# Patient Record
Sex: FEMALE | Race: WHITE | Employment: UNEMPLOYED | URBAN - METROPOLITAN AREA
[De-identification: names, ages, dates, MRNs, and addresses within clinical notes are randomized per-mention and may not be internally consistent; named-entity substitution may affect disease eponyms.]

---

## 2017-05-04 PROBLEM — K50.90 CROHN DISEASE (HCC): Status: ACTIVE | Noted: 2017-05-04

## 2017-05-09 NOTE — H&P
Gynecology History and Physical    Name: Christina Rosenbaum MRN: [id number] SSN: [id number]    YOB: 1980  Age: 30 y.o. Sex: female       Subjective:      Chief complaint:  Inevitable     Thomas Farooq is a 30 y.o.  female with a history of US on two days 4 days apart showing 6 week 2 day IUP with no FCA and no progression of pregnancy. She is admitted for Procedure(s) (LRB):  DILATATION AND CURETTAGE WITH SUCTION/ GESTATION= 10 WEEKS/ O+ BLOOD TYPE  (N/A). The current method of family planning is none. OB History      Para Term  AB TAB SAB Ectopic Multiple Living    7 3 2  1  1   3        Past Medical History:   Diagnosis Date    Abnormal Pap smear     Anemia NEC     Crohn's disease ([address]     Headache     Other ill-defined conditions     anemia    Perirectal abscess      Past Surgical History:   Procedure Laterality Date     DELIVERY ONLY      HX ADENOIDECTOMY      HX  SECTION      HX OTHER SURGICAL      tonsilectomy    HX OTHER SURGICAL      cesarian section    HX OTHER SURGICAL      rectal absess    HX TONSILLECTOMY      HX WISDOM TEETH EXTRACTION       Social History     Occupational History    Not on file. Social History Main Topics    Smoking status: Never Smoker    Smokeless tobacco: Not on file    Alcohol use No    Drug use: No    Sexual activity: Yes     Partners: Male     Family History   Problem Relation Age of Onset    Hypertension Father     High Cholesterol Father     High Cholesterol Mother         Allergies   Allergen Reactions    Sulfa (Sulfonamide Antibiotics) Hives     Prior to Admission medications    Medication Sig Start Date End Date Taking? Authorizing Provider   INFLIXIMAB (REMICADE IV) by IntraVENous route. Historical Provider   prenatal vit-iron fumarate-fa (PRENATAL S) 27-0.8 mg Tab tablet Take 1 Tab by mouth daily.     Historical Provider        Review of Systems:  A comprehensive review of systems was negative except for that written in the History of Present Illness. Objective: There were no vitals filed for this visit. Physical Exam:  Patient without distress. Heart: Regular rate and rhythm  Lung: clear to auscultation throughout lung fields, no wheezes, no rales, no rhonchi and normal respiratory effort    Assessment:     Active Problems:    * No active hospital problems. *     Inevitable  with history of recurrent SAB    Plan:     Procedure(s) (LRB):  DILATATION AND CURETTAGE WITH SUCTION/ GESTATION= 10 WEEKS/ O+ BLOOD TYPE  (N/A)  Discussed the risks of surgery including the risks of bleeding, infection, deep vein thrombosis, and surgical injuries to internal organs including but not limited to the bowels, bladder, rectum, and female reproductive organs. The patient understands the risks; any and all questions were answered to the patient's satisfaction.     Signed By:  Linnea Nielson MD     May 9, 2017

## 2017-05-10 ENCOUNTER — ANESTHESIA (OUTPATIENT)
Dept: SURGERY | Age: 37
End: 2017-05-10
Payer: COMMERCIAL

## 2017-05-10 ENCOUNTER — ANESTHESIA EVENT (OUTPATIENT)
Dept: SURGERY | Age: 37
End: 2017-05-10
Payer: COMMERCIAL

## 2017-05-10 ENCOUNTER — HOSPITAL ENCOUNTER (OUTPATIENT)
Age: 37
Setting detail: OUTPATIENT SURGERY
Discharge: HOME | End: 2017-05-10
Attending: OBSTETRICS & GYNECOLOGY | Admitting: OBSTETRICS & GYNECOLOGY
Payer: COMMERCIAL

## 2017-05-10 VITALS
HEART RATE: 82 BPM | SYSTOLIC BLOOD PRESSURE: 102 MMHG | OXYGEN SATURATION: 100 % | WEIGHT: 141.25 LBS | BODY MASS INDEX: 25.03 KG/M2 | RESPIRATION RATE: 20 BRPM | DIASTOLIC BLOOD PRESSURE: 64 MMHG | HEIGHT: 63 IN | TEMPERATURE: 98.1 F

## 2017-05-10 DIAGNOSIS — O03.9 SAB (SPONTANEOUS ABORTION): Primary | ICD-10-CM

## 2017-05-10 PROCEDURE — 74011000250 HC RX REV CODE- 250

## 2017-05-10 PROCEDURE — 76210000006 HC OR PH I REC 0.5 TO 1 HR: Performed by: OBSTETRICS & GYNECOLOGY

## 2017-05-10 PROCEDURE — 74011250636 HC RX REV CODE- 250/636

## 2017-05-10 PROCEDURE — 74011250636 HC RX REV CODE- 250/636: Performed by: OBSTETRICS & GYNECOLOGY

## 2017-05-10 PROCEDURE — 77030012317 HC CATH URET INT COVD -A: Performed by: OBSTETRICS & GYNECOLOGY

## 2017-05-10 PROCEDURE — 76060000032 HC ANESTHESIA 0.5 TO 1 HR: Performed by: OBSTETRICS & GYNECOLOGY

## 2017-05-10 PROCEDURE — 88305 TISSUE EXAM BY PATHOLOGIST: CPT | Performed by: OBSTETRICS & GYNECOLOGY

## 2017-05-10 PROCEDURE — 77030020143 HC AIRWY LARYN INTUB CGAS -A: Performed by: ANESTHESIOLOGY

## 2017-05-10 PROCEDURE — 76010000154 HC OR TIME FIRST 0.5 HR: Performed by: OBSTETRICS & GYNECOLOGY

## 2017-05-10 PROCEDURE — 76210000020 HC REC RM PH II FIRST 0.5 HR: Performed by: OBSTETRICS & GYNECOLOGY

## 2017-05-10 PROCEDURE — 77030008579 HC TBNG UTER SUC CARD -A: Performed by: OBSTETRICS & GYNECOLOGY

## 2017-05-10 PROCEDURE — 77030020164: Performed by: OBSTETRICS & GYNECOLOGY

## 2017-05-10 PROCEDURE — 74011250636 HC RX REV CODE- 250/636: Performed by: ANESTHESIOLOGY

## 2017-05-10 PROCEDURE — 77030020782 HC GWN BAIR PAWS FLX 3M -B: Performed by: ANESTHESIOLOGY

## 2017-05-10 PROCEDURE — 77030018836 HC SOL IRR NACL ICUM -A: Performed by: OBSTETRICS & GYNECOLOGY

## 2017-05-10 PROCEDURE — 77030011640 HC PAD GRND REM COVD -A: Performed by: OBSTETRICS & GYNECOLOGY

## 2017-05-10 RX ORDER — SODIUM CHLORIDE, SODIUM LACTATE, POTASSIUM CHLORIDE, CALCIUM CHLORIDE 600; 310; 30; 20 MG/100ML; MG/100ML; MG/100ML; MG/100ML
100 INJECTION, SOLUTION INTRAVENOUS CONTINUOUS
Status: DISCONTINUED | OUTPATIENT
Start: 2017-05-10 | End: 2017-05-10 | Stop reason: HOSPADM

## 2017-05-10 RX ORDER — HYDROMORPHONE HYDROCHLORIDE 2 MG/ML
0.5 INJECTION, SOLUTION INTRAMUSCULAR; INTRAVENOUS; SUBCUTANEOUS
Status: DISCONTINUED | OUTPATIENT
Start: 2017-05-10 | End: 2017-05-10 | Stop reason: HOSPADM

## 2017-05-10 RX ORDER — IBUPROFEN 800 MG/1
800 TABLET ORAL
Qty: 30 TAB | Refills: 0 | Status: SHIPPED | OUTPATIENT
Start: 2017-05-10 | End: 2018-03-12

## 2017-05-10 RX ORDER — ONDANSETRON 2 MG/ML
INJECTION INTRAMUSCULAR; INTRAVENOUS AS NEEDED
Status: DISCONTINUED | OUTPATIENT
Start: 2017-05-10 | End: 2017-05-10 | Stop reason: HOSPADM

## 2017-05-10 RX ORDER — SODIUM CHLORIDE 0.9 % (FLUSH) 0.9 %
5-10 SYRINGE (ML) INJECTION EVERY 8 HOURS
Status: DISCONTINUED | OUTPATIENT
Start: 2017-05-10 | End: 2017-05-10 | Stop reason: HOSPADM

## 2017-05-10 RX ORDER — LIDOCAINE HYDROCHLORIDE 10 MG/ML
0.1 INJECTION INFILTRATION; PERINEURAL AS NEEDED
Status: DISCONTINUED | OUTPATIENT
Start: 2017-05-10 | End: 2017-05-10 | Stop reason: HOSPADM

## 2017-05-10 RX ORDER — LIDOCAINE HYDROCHLORIDE 20 MG/ML
INJECTION, SOLUTION EPIDURAL; INFILTRATION; INTRACAUDAL; PERINEURAL AS NEEDED
Status: DISCONTINUED | OUTPATIENT
Start: 2017-05-10 | End: 2017-05-10 | Stop reason: HOSPADM

## 2017-05-10 RX ORDER — MIDAZOLAM HYDROCHLORIDE 1 MG/ML
2 INJECTION, SOLUTION INTRAMUSCULAR; INTRAVENOUS ONCE
Status: DISCONTINUED | OUTPATIENT
Start: 2017-05-10 | End: 2017-05-10 | Stop reason: HOSPADM

## 2017-05-10 RX ORDER — SODIUM CHLORIDE 0.9 % (FLUSH) 0.9 %
5-10 SYRINGE (ML) INJECTION AS NEEDED
Status: DISCONTINUED | OUTPATIENT
Start: 2017-05-10 | End: 2017-05-10 | Stop reason: HOSPADM

## 2017-05-10 RX ORDER — MIDAZOLAM HYDROCHLORIDE 1 MG/ML
2 INJECTION, SOLUTION INTRAMUSCULAR; INTRAVENOUS
Status: COMPLETED | OUTPATIENT
Start: 2017-05-10 | End: 2017-05-10

## 2017-05-10 RX ORDER — PROPOFOL 10 MG/ML
INJECTION, EMULSION INTRAVENOUS AS NEEDED
Status: DISCONTINUED | OUTPATIENT
Start: 2017-05-10 | End: 2017-05-10 | Stop reason: HOSPADM

## 2017-05-10 RX ORDER — SODIUM CHLORIDE, SODIUM LACTATE, POTASSIUM CHLORIDE, CALCIUM CHLORIDE 600; 310; 30; 20 MG/100ML; MG/100ML; MG/100ML; MG/100ML
75 INJECTION, SOLUTION INTRAVENOUS CONTINUOUS
Status: DISCONTINUED | OUTPATIENT
Start: 2017-05-10 | End: 2017-05-10 | Stop reason: HOSPADM

## 2017-05-10 RX ORDER — OXYCODONE HYDROCHLORIDE 5 MG/1
5 TABLET ORAL
Qty: 20 TAB | Refills: 0 | Status: SHIPPED | OUTPATIENT
Start: 2017-05-10 | End: 2018-02-07

## 2017-05-10 RX ORDER — FENTANYL CITRATE 50 UG/ML
100 INJECTION, SOLUTION INTRAMUSCULAR; INTRAVENOUS ONCE
Status: DISCONTINUED | OUTPATIENT
Start: 2017-05-10 | End: 2017-05-10 | Stop reason: HOSPADM

## 2017-05-10 RX ORDER — FENTANYL CITRATE 50 UG/ML
INJECTION, SOLUTION INTRAMUSCULAR; INTRAVENOUS AS NEEDED
Status: DISCONTINUED | OUTPATIENT
Start: 2017-05-10 | End: 2017-05-10 | Stop reason: HOSPADM

## 2017-05-10 RX ORDER — OXYCODONE HYDROCHLORIDE 5 MG/1
5 TABLET ORAL
Status: DISCONTINUED | OUTPATIENT
Start: 2017-05-10 | End: 2017-05-10 | Stop reason: HOSPADM

## 2017-05-10 RX ORDER — DEXAMETHASONE SODIUM PHOSPHATE 4 MG/ML
INJECTION, SOLUTION INTRA-ARTICULAR; INTRALESIONAL; INTRAMUSCULAR; INTRAVENOUS; SOFT TISSUE AS NEEDED
Status: DISCONTINUED | OUTPATIENT
Start: 2017-05-10 | End: 2017-05-10 | Stop reason: HOSPADM

## 2017-05-10 RX ADMIN — MIDAZOLAM HYDROCHLORIDE 2 MG: 1 INJECTION, SOLUTION INTRAMUSCULAR; INTRAVENOUS at 11:17

## 2017-05-10 RX ADMIN — FENTANYL CITRATE 50 MCG: 50 INJECTION, SOLUTION INTRAMUSCULAR; INTRAVENOUS at 13:03

## 2017-05-10 RX ADMIN — HYDROMORPHONE HYDROCHLORIDE 0.5 MG: 2 INJECTION, SOLUTION INTRAMUSCULAR; INTRAVENOUS; SUBCUTANEOUS at 13:30

## 2017-05-10 RX ADMIN — DEXAMETHASONE SODIUM PHOSPHATE 8 MG: 4 INJECTION, SOLUTION INTRA-ARTICULAR; INTRALESIONAL; INTRAMUSCULAR; INTRAVENOUS; SOFT TISSUE at 13:04

## 2017-05-10 RX ADMIN — ONDANSETRON 4 MG: 2 INJECTION INTRAMUSCULAR; INTRAVENOUS at 13:04

## 2017-05-10 RX ADMIN — PROPOFOL 200 MG: 10 INJECTION, EMULSION INTRAVENOUS at 12:53

## 2017-05-10 RX ADMIN — SODIUM CHLORIDE, SODIUM LACTATE, POTASSIUM CHLORIDE, AND CALCIUM CHLORIDE 75 ML/HR: 600; 310; 30; 20 INJECTION, SOLUTION INTRAVENOUS at 10:50

## 2017-05-10 RX ADMIN — LIDOCAINE HYDROCHLORIDE 100 MG: 20 INJECTION, SOLUTION EPIDURAL; INFILTRATION; INTRACAUDAL; PERINEURAL at 12:53

## 2017-05-10 RX ADMIN — FENTANYL CITRATE 50 MCG: 50 INJECTION, SOLUTION INTRAMUSCULAR; INTRAVENOUS at 12:53

## 2017-05-10 RX ADMIN — HYDROMORPHONE HYDROCHLORIDE 0.5 MG: 2 INJECTION, SOLUTION INTRAMUSCULAR; INTRAVENOUS; SUBCUTANEOUS at 13:35

## 2017-05-10 RX ADMIN — SODIUM CHLORIDE, SODIUM LACTATE, POTASSIUM CHLORIDE, AND CALCIUM CHLORIDE: 600; 310; 30; 20 INJECTION, SOLUTION INTRAVENOUS at 13:12

## 2017-05-10 NOTE — BRIEF OP NOTE
BRIEF OPERATIVE NOTE    Date of Procedure: 5/10/2017   Preoperative Diagnosis: Missed ab [O02.1]  Postoperative Diagnosis: Missed ab [O02.1]    Procedure(s):  DILATATION AND CURETTAGE WITH SUCTION/ GESTATION= 10 WEEKS/ O+ BLOOD TYPE   Surgeon(s) and Role:     * Harjeet Singh MD - Primary         Assistant Staff:       Surgical Staff:  Circ-1: Chet Rice RN  Scrub Tech-1: [employer]  Scrub Tech-2: [employer] [place]  Event Time In   Incision Start 1302   Incision Close 1309     Anesthesia: General   Estimated Blood Loss: 150 ml  Specimens:   ID Type Source Tests Collected by Time Destination   1 : Products of conception Fresh Uterus  Harjeet Singh MD 5/10/2017 1309 Pathology      Findings: 7 weeks sized anteverted uterus.   Normal EGBSUVVC, Moderate amount of POC   Complications: none  Implants: * No implants in log *

## 2017-05-10 NOTE — IP AVS SNAPSHOT
Gloria Margaret 
 
 
 [address] 
[phone] Patient: Karla Sprain MRN: [id number] IUJ:3/40/1827 You are allergic to the following Allergen Reactions Sulfa (Sulfonamide Antibiotics) Hives Recent Documentation Height Weight BMI OB Status Smoking Status 1.6 m 64.1 kg 25.02 kg/m2 Needs review Never Smoker Emergency Contacts Name Discharge Info Relation Home Work Mobile Chilango Pollack  Spouse [3]   [phone] About your hospitalization You were admitted on:  May 10, 2017 You last received care in the:  [facility] PACU You were discharged on:  May 10, 2017 Unit phone number:  [phone] Why you were hospitalized Your primary diagnosis was:  Not on File Providers Seen During Your Hospitalizations Provider Role Specialty Primary office phone Crissy Gordon MD Attending Provider Obstetrics & Gynecology [phone] Your Primary Care Physician (PCP) Primary Care Physician Office Phone Office Fax Abdi Simpson [phone] Follow-up Information Follow up With Details Comments Contact Info La Irving MD   [address] [place] [address] [phone] Crissy Gordon MD Schedule an appointment as soon as possible for a visit in 2 week(s)  [address] [place] [address] OB [facility]YN Group [facility] [address] 
[phone] Current Discharge Medication List  
  
START taking these medications Dose & Instructions Dispensing Information Comments Morning Noon Evening Bedtime  
 ibuprofen 800 mg tablet Commonly known as:  MOTRIN Your last dose was: Your next dose is:    
   
   
 Dose:  800 mg Take 1 Tab by mouth every eight (8) hours as needed for Pain. Quantity:  30 Tab Refills:  0 oxyCODONE IR 5 mg immediate release tablet Commonly known as:  Megan Esquivel Your last dose was: Your next dose is:    
   
   
 Dose:  5 mg Take 1 Tab by mouth once as needed. Max Daily Amount: 5 mg. Quantity:  20 Tab Refills:  0 CONTINUE these medications which have NOT CHANGED Dose & Instructions Dispensing Information Comments Morning Noon Evening Bedtime PRENATAL S 27-0.8 mg Tab tablet Generic drug:  prenatal vit-iron fumarate-fa Your last dose was: Your next dose is:    
   
   
 Dose:  1 Tab Take 1 Tab by mouth daily. Refills:  0 REMICADE IV Your last dose was: Your next dose is:    
   
   
 by IntraVENous route. Refills:  0 Where to Get Your Medications Information on where to get these meds will be given to you by the nurse or doctor. ! Ask your nurse or doctor about these medications  
  ibuprofen 800 mg tablet  
 oxyCODONE IR 5 mg immediate release tablet Discharge Instructions Dilation and Curettage: What to Expect at [facility] Your Recovery Dilation and curettage (D&C) is a procedure to remove tissue from the inside of the uterus. The doctor used a curved tool, called a curette, to gently scrape tissue from your uterus. You are likely to have a backache, or cramps similar to menstrual cramps, and pass small clots of blood from your vagina for the first few days. You may continue to have light vaginal bleeding for several weeks after the procedure. You will probably be able to go back to most of your normal activities in 1 or 2 days. This care sheet gives you a general idea about how long it will take for you to recover. But each person recovers at a different pace. Follow the steps below to get better as quickly as possible. How can you care for yourself at home? Activity · Rest when you feel tired. Getting enough sleep will help you recover. · Avoid strenuous activities, such as bicycle riding, jogging, weight lifting, or aerobic exercise, until your doctor says it is okay. · Most women are able to return to work the day after the procedure. · You may have some light vaginal bleeding. Wear sanitary pads if needed. Do not douche or use tampons for 2 weeks or until your doctor says it is okay. · Ask your doctor when it is okay for you to have sex. · If you could become pregnant, talk about birth control with your doctor. Do not try to become pregnant until your doctor says it is okay. Diet · You can eat your normal diet. If your stomach is upset, try bland, low-fat foods like plain rice, broiled chicken, toast, and yogurt. · Drink plenty of fluids (unless your doctor tells you not to). Medicines · Your doctor will tell you if and when you can restart your medicines. He or she will also give you instructions about taking any new medicines. · If you take blood thinners, such as warfarin (Coumadin), clopidogrel (Plavix), or aspirin, be sure to talk to your doctor. He or she will tell you if and when to start taking those medicines again. Make sure that you understand exactly what your doctor wants you to do. · Be safe with medicines. Take pain medicines exactly as directed. ¨ If the doctor gave you a prescription medicine for pain, take it as prescribed. ¨ If you are not taking a prescription pain medicine, ask your doctor if you can take an over-the-counter medicine. · If you think your pain medicine is making you sick to your stomach: 
¨ Take your medicine after meals (unless your doctor has told you not to). ¨ Ask your doctor for a different pain medicine. · If your doctor prescribed antibiotics, take them as directed. Do not stop taking them just because you feel better. You need to take the full course of antibiotics. Follow-up care is a key part of your treatment and safety. Be sure to make and go to all appointments, and call your doctor if you are having problems. It's also a good idea to know your test results and keep a list of the medicines you take. When should you call for help? Call 911 anytime you think you may need emergency care. For example, call if: 
· You passed out (lost consciousness). · You have severe trouble breathing. · You have chest pain and shortness of breath, or you cough up blood. · You have severe pain in your belly. Call your doctor now or seek immediate medical care if: 
· You have bright red vaginal bleeding that soaks one or more pads each hour for 2 or more hours. · You pass blood clots that are larger than a golf ball. · You have vaginal discharge that smells bad. · You are sick to your stomach or cannot keep fluids down. · You have pain that does not get better after you take pain medicine. · You have pain that is getting worse 2 days after the procedure. · You have a fever over 100°F. 
· Your belly feels tender, or full and hard. Watch closely for changes in your health, and be sure to contact your doctor if: 
· You do not get better as expected. Where can you learn more? Go to http://renetta-candida.info/. Enter [phone] in the search box to learn more about \"Dilation and Curettage: What to Expect at Home. \" Current as of: May 30, 2016 Content Version: 11.2 © 7111-8465 [employer]. Care instructions adapted under license by [employer] (which disclaims liability or warranty for this information). If you have questions about a medical condition or this instruction, always ask your healthcare professional. [place] [address] any warranty or liability for your use of this information. Discharge Orders None Introducing \[facility]\"" & HEALTH SERVICES!    
 Jonah Summers introduces [facility] patient portal. Now you can access parts of your medical record, email your doctor's office, and request medication refills online. 1. In your internet browser, go to [email or web address] 2. Click on the First Time User? Click Here link in the Sign In box. You will see the New Member Sign Up page. 3. Enter your [employer] Access Code exactly as it appears below. You will not need to use this code after youve completed the sign-up process. If you do not sign up before the expiration date, you must request a new code. · [employer] Access Code: [email or web address] Expires: 7/6/2017  9:57 AM 
 
4. Enter the last four digits of your Social Security Number (xxxx) and Date of Birth (mm/dd/yyyy) as indicated and click Submit. You will be taken to the next sign-up page. 5. Create a [employer] ID. This will be your [employer] login ID and cannot be changed, so think of one that is secure and easy to remember. 6. Create a [employer] password. You can change your password at any time. 7. Enter your Password Reset Question and Answer. This can be used at a later time if you forget your password. 8. Enter your e-mail address. You will receive e-mail notification when new information is available in [address] 9. Click Sign Up. You can now view and download portions of your medical record. 10. Click the Download Summary menu link to download a portable copy of your medical information. If you have questions, please visit the Frequently Asked Questions section of the [employer] website. Remember, [employer] is NOT to be used for urgent needs. For medical emergencies, dial 911. Now available from your iPhone and Android! General Information Please provide this summary of care documentation to your next provider. Patient Signature:  ____________________________________________________________ Date:  ____________________________________________________________  
  
Neita Hidden  Provider Signature: ____________________________________________________________ Date:  ____________________________________________________________

## 2017-05-10 NOTE — ANESTHESIA POSTPROCEDURE EVALUATION
Post-Anesthesia Evaluation and Assessment    Patient: Silva  MRN: [id number]  SSN: [id number]    YOB: 1980  Age: 30 y.o. Sex: female       Cardiovascular Function/Vital Signs  Visit Vitals    /65 (BP 1 Location: Left arm, BP Patient Position: At rest)    Pulse 93    Temp 36.7 °C (98.1 °F)    Resp 20    Ht 5' 3\" (1.6 m)    Wt 64.1 kg (141 lb 4 oz)    SpO2 100%    BMI 25.02 kg/m2       Patient is status post general anesthesia for Procedure(s):  DILATATION AND CURETTAGE WITH SUCTION/ GESTATION= 10 WEEKS/ O+ BLOOD TYPE . Nausea/Vomiting: None    Postoperative hydration reviewed and adequate. Pain:  Pain Scale 1: Numeric (0 - 10) (05/10/17 1335)  Pain Intensity 1: 4 (05/10/17 1335)   Managed    Neurological Status:   Neuro (WDL): Exceptions to WDL (05/10/17 1316)  Neuro  Neurologic State: Anesthetized (05/10/17 1316)   At baseline    Mental Status and Level of Consciousness: Awake. Pulmonary Status:   O2 Device: Room air (05/10/17 1346)   Adequate oxygenation and airway patent    Complications related to anesthesia: None    Post-anesthesia assessment completed.  No concerns    Signed By: Savana Lorenzo MD     May 10, 2017

## 2017-05-10 NOTE — IP AVS SNAPSHOT
Current Discharge Medication List  
  
START taking these medications Dose & Instructions Dispensing Information Comments Morning Noon Evening Bedtime  
 ibuprofen 800 mg tablet Commonly known as:  MOTRIN Your last dose was: Your next dose is:    
   
   
 Dose:  800 mg Take 1 Tab by mouth every eight (8) hours as needed for Pain. Quantity:  30 Tab Refills:  0  
     
   
   
   
  
 oxyCODONE IR 5 mg immediate release tablet Commonly known as:  Leo Longs Your last dose was: Your next dose is:    
   
   
 Dose:  5 mg Take 1 Tab by mouth once as needed. Max Daily Amount: 5 mg. Quantity:  20 Tab Refills:  0 CONTINUE these medications which have NOT CHANGED Dose & Instructions Dispensing Information Comments Morning Noon Evening Bedtime PRENATAL S 27-0.8 mg Tab tablet Generic drug:  prenatal vit-iron fumarate-fa Your last dose was: Your next dose is:    
   
   
 Dose:  1 Tab Take 1 Tab by mouth daily. Refills:  0 REMICADE IV Your last dose was: Your next dose is:    
   
   
 by IntraVENous route. Refills:  0 Where to Get Your Medications Information on where to get these meds will be given to you by the nurse or doctor. ! Ask your nurse or doctor about these medications  
  ibuprofen 800 mg tablet  
 oxyCODONE IR 5 mg immediate release tablet

## 2017-05-10 NOTE — OP NOTES
Marciana Koyanagi  MRN [id number]  DOS 5/10/17    OPERATIVE NOTE      Preoperative Diagnosis: Missed  at 10 weeks by LMP, O Positive blood type  Postoperative Diagnosis: same  Procedure: suction dilation and curettage  Surgeon:  Louis Galicia. Len Khan MD  Anesthesia:  General LMA  EBL: 150 ml  IVF: 1000 ml crystalloid  UOP: 100 ml clear urine drained prior to the case  Complications: none    Findings: 8 weeks anteverted uterus. Moderate amount of products of conception. Normal external genitals, vagina and cervix    Procedure:   Patient was taken to OR where GLMA was obtained without difficulty. She was then sterilly prepped and draped in dorsal lithotomy position in candy cane stirrups in usual fashion. Weighted sterile speculum was placed in patients posterior fornix and cervix visualized and grasped anteriorly with a single toothed tenaculum. Cervix was noted to be dilated to [address] already and 8mm curved suction curette was introduced to the fundus without difficulty. Suction was applied with evacuation of the products of conception. Sharp currettage was then undertaken until a gritty texture was appreciated throughout the endometrial cavity and the currettings collected for histologic examination. The remaining products of conception were then removed with the suction curette without difficulty. Instruments were then removed from the patient. Patient tolerated the procedure well. All counts were correct. Patient went to recovery in stable condition.

## 2017-05-10 NOTE — PROGRESS NOTES
Support given to patient in 6w2d fetal demise. \"[employer]\" early pregnancy loss brochure and Chasity Cobb support group information given along with contact information. Ayala Zaldivar M.Div.

## 2017-05-10 NOTE — DISCHARGE INSTRUCTIONS
Dilation and Curettage: What to Expect at Home  Your Recovery  Dilation and curettage (D&C) is a procedure to remove tissue from the inside of the uterus. The doctor used a curved tool, called a curette, to gently scrape tissue from your uterus. You are likely to have a backache, or cramps similar to menstrual cramps, and pass small clots of blood from your vagina for the first few days. You may continue to have light vaginal bleeding for several weeks after the procedure. You will probably be able to go back to most of your normal activities in 1 or 2 days. This care sheet gives you a general idea about how long it will take for you to recover. But each person recovers at a different pace. Follow the steps below to get better as quickly as possible. How can you care for yourself at home? Activity  · Rest when you feel tired. Getting enough sleep will help you recover. · Avoid strenuous activities, such as bicycle riding, jogging, weight lifting, or aerobic exercise, until your doctor says it is okay. · Most women are able to return to work the day after the procedure. · You may have some light vaginal bleeding. Wear sanitary pads if needed. Do not douche or use tampons for 2 weeks or until your doctor says it is okay. · Ask your doctor when it is okay for you to have sex. · If you could become pregnant, talk about birth control with your doctor. Do not try to become pregnant until your doctor says it is okay. Diet  · You can eat your normal diet. If your stomach is upset, try bland, low-fat foods like plain rice, broiled chicken, toast, and yogurt. · Drink plenty of fluids (unless your doctor tells you not to). Medicines  · Your doctor will tell you if and when you can restart your medicines. He or she will also give you instructions about taking any new medicines. · If you take blood thinners, such as warfarin (Coumadin), clopidogrel (Plavix), or aspirin, be sure to talk to your doctor.  He or she will tell you if and when to start taking those medicines again. Make sure that you understand exactly what your doctor wants you to do. · Be safe with medicines. Take pain medicines exactly as directed. ¨ If the doctor gave you a prescription medicine for pain, take it as prescribed. ¨ If you are not taking a prescription pain medicine, ask your doctor if you can take an over-the-counter medicine. · If you think your pain medicine is making you sick to your stomach:  ¨ Take your medicine after meals (unless your doctor has told you not to). ¨ Ask your doctor for a different pain medicine. · If your doctor prescribed antibiotics, take them as directed. Do not stop taking them just because you feel better. You need to take the full course of antibiotics. Follow-up care is a key part of your treatment and safety. Be sure to make and go to all appointments, and call your doctor if you are having problems. It's also a good idea to know your test results and keep a list of the medicines you take. When should you call for help? Call 911 anytime you think you may need emergency care. For example, call if:  · You passed out (lost consciousness). · You have severe trouble breathing. · You have chest pain and shortness of breath, or you cough up blood. · You have severe pain in your belly. Call your doctor now or seek immediate medical care if:  · You have bright red vaginal bleeding that soaks one or more pads each hour for 2 or more hours. · You pass blood clots that are larger than a golf ball. · You have vaginal discharge that smells bad. · You are sick to your stomach or cannot keep fluids down. · You have pain that does not get better after you take pain medicine. · You have pain that is getting worse 2 days after the procedure. · You have a fever over 100°F.  · Your belly feels tender, or full and hard.   Watch closely for changes in your health, and be sure to contact your doctor if:  · You do not get better as expected. Where can you learn more? Go to http://renetta-candida.info/. Enter [phone] in the search box to learn more about \"Dilation and Curettage: What to Expect at Home. \"  Current as of: May 30, 2016  Content Version: 11.2  © 0864-5649 [employer]. Care instructions adapted under license by [employer] (which disclaims liability or warranty for this information). If you have questions about a medical condition or this instruction, always ask your healthcare professional. [address] any warranty or liability for your use of this information.

## 2017-05-10 NOTE — ANESTHESIA PREPROCEDURE EVALUATION
Anesthetic History   No history of anesthetic complications            Review of Systems / Medical History  Pertinent labs reviewed    Pulmonary  Within defined limits                 Neuro/Psych         Headaches     Cardiovascular  Within defined limits                Exercise tolerance: >4 METS     GI/Hepatic/Renal               Comments: Crohn's dz Endo/Other        Anemia     Other Findings            Physical Exam    Airway  Mallampati: II  TM Distance: 4 - 6 cm  Neck ROM: normal range of motion   Mouth opening: Diminished (comment)     Cardiovascular  Regular rate and rhythm,  S1 and S2 normal,  no murmur, click, rub, or gallop             Dental  No notable dental hx       Pulmonary  Breath sounds clear to auscultation               Abdominal  GI exam deferred       Other Findings            Anesthetic Plan    ASA: 2  Anesthesia type: general          Induction: Intravenous  Anesthetic plan and risks discussed with: Patient

## 2018-02-19 PROBLEM — Z98.891 HISTORY OF C-SECTION: Status: ACTIVE | Noted: 2018-02-19

## 2018-02-19 PROBLEM — O09.529 AMA (ADVANCED MATERNAL AGE) MULTIGRAVIDA 35+: Status: ACTIVE | Noted: 2018-02-19

## 2018-02-19 PROBLEM — N96 HISTORY OF MULTIPLE MISCARRIAGES: Status: ACTIVE | Noted: 2018-02-19

## 2018-02-19 PROBLEM — K60.4 PERIRECTAL FISTULA: Status: ACTIVE | Noted: 2018-02-19

## 2018-03-12 PROBLEM — O34.219 HX OF CESAREAN SECTION COMPLICATING PREGNANCY: Status: ACTIVE | Noted: 2018-02-19

## 2018-03-12 PROBLEM — Z86.69 HX OF MIGRAINE DURING PREGNANCY: Status: ACTIVE | Noted: 2018-03-12

## 2018-03-12 PROBLEM — O99.611: Status: ACTIVE | Noted: 2017-05-04

## 2018-03-12 PROBLEM — Z36.82 ENCOUNTER FOR (NT) NUCHAL TRANSLUCENCY SCAN: Status: ACTIVE | Noted: 2018-03-12

## 2018-03-12 PROBLEM — Z87.59 HX OF MIGRAINE DURING PREGNANCY: Status: ACTIVE | Noted: 2018-03-12

## 2018-03-12 PROBLEM — O26.21: Status: ACTIVE | Noted: 2018-02-19

## 2018-04-24 PROBLEM — O09.522 ELDERLY MULTIGRAVIDA IN SECOND TRIMESTER: Status: ACTIVE | Noted: 2018-02-19

## 2018-04-24 PROBLEM — O26.22: Status: ACTIVE | Noted: 2018-02-19

## 2018-04-24 PROBLEM — O99.612: Status: ACTIVE | Noted: 2017-05-04

## 2018-04-24 PROBLEM — Z36.82 ENCOUNTER FOR (NT) NUCHAL TRANSLUCENCY SCAN: Status: RESOLVED | Noted: 2018-03-12 | Resolved: 2018-04-24

## 2018-04-24 PROBLEM — O26.892 HEADACHE IN PREGNANCY, ANTEPARTUM, SECOND TRIMESTER: Status: ACTIVE | Noted: 2018-03-12

## 2018-04-24 PROBLEM — O34.211 MATERNAL CARE DUE TO LOW TRANSVERSE UTERINE SCAR FROM PREVIOUS CESAREAN DELIVERY: Status: ACTIVE | Noted: 2018-02-19

## 2018-04-24 PROBLEM — R51.9 HEADACHE IN PREGNANCY, ANTEPARTUM, SECOND TRIMESTER: Status: ACTIVE | Noted: 2018-03-12

## 2018-07-10 PROBLEM — F11.90 OPIOID USE, UNSPECIFIED, UNCOMPLICATED: Status: ACTIVE | Noted: 2018-07-10

## 2018-09-13 NOTE — PROGRESS NOTES
Patient ID verified. Allergies, medical history, prenatal record and prior to admission medications verified. Pt instructed to be NPO after midnight. Pt instructed to arrive at hospital @0645,come to entrance C and sign in at the registration desk on the 4th floor. Patient instructed to come to hospital sooner if SROM, labor, or concerning symptoms. Patient verbalized understanding. Questions encouraged and answered.

## 2018-09-14 ENCOUNTER — ANESTHESIA (OUTPATIENT)
Dept: LABOR AND DELIVERY | Age: 38
End: 2018-09-14
Payer: COMMERCIAL

## 2018-09-14 ENCOUNTER — ANESTHESIA EVENT (OUTPATIENT)
Dept: LABOR AND DELIVERY | Age: 38
End: 2018-09-14
Payer: COMMERCIAL

## 2018-09-14 ENCOUNTER — HOSPITAL ENCOUNTER (INPATIENT)
Age: 38
LOS: 3 days | Discharge: HOME | End: 2018-09-17
Attending: OBSTETRICS & GYNECOLOGY | Admitting: OBSTETRICS & GYNECOLOGY
Payer: COMMERCIAL

## 2018-09-14 DIAGNOSIS — O34.211 MATERNAL CARE DUE TO LOW TRANSVERSE UTERINE SCAR FROM PREVIOUS CESAREAN DELIVERY: Primary | ICD-10-CM

## 2018-09-14 PROBLEM — Z3A.39 39 WEEKS GESTATION OF PREGNANCY: Status: ACTIVE | Noted: 2018-09-14

## 2018-09-14 PROBLEM — Z98.891 H/O CESAREAN SECTION: Status: ACTIVE | Noted: 2018-09-14

## 2018-09-14 LAB
ABO + RH BLD: NORMAL
BASE DEFICIT BLDCOA-SCNC: 0.5 MMOL/L (ref 0–2)
BASE DEFICIT BLDCOV-SCNC: 1.4 MMOL/L (ref 1.9–7.7)
BDY SITE: ABNORMAL
BDY SITE: ABNORMAL
BLOOD GROUP ANTIBODIES SERPL: NORMAL
ERYTHROCYTE [DISTWIDTH] IN BLOOD BY AUTOMATED COUNT: 18.7 %
HCO3 BLDCOA-SCNC: 26 MMOL/L (ref 22–26)
HCO3 BLDV-SCNC: 22 MMOL/L
HCT VFR BLD AUTO: 35.5 % (ref 35.8–46.3)
HGB BLD-MCNC: 11.8 G/DL (ref 11.7–15.4)
MCH RBC QN AUTO: 29.9 PG (ref 26.1–32.9)
MCHC RBC AUTO-ENTMCNC: 33.2 G/DL (ref 31.4–35)
MCV RBC AUTO: 90.1 FL (ref 79.6–97.8)
NRBC # BLD: 0 K/UL (ref 0–0.2)
PCO2 BLDCOA: 47 MMHG (ref 33–49)
PCO2 BLDCOV: 35 MMHG (ref 14.1–43.3)
PH BLDCOA: 7.35 [PH] (ref 7.21–7.31)
PH BLDCOV: 7.42 [PH] (ref 7.2–7.44)
PLATELET # BLD AUTO: 187 K/UL (ref 150–450)
PMV BLD AUTO: 9.6 FL (ref 9.4–12.3)
PO2 BLDCOA: 18 MMHG (ref 9–19)
PO2 BLDV: 36 MMHG (ref 30.4–57.2)
RBC # BLD AUTO: 3.94 M/UL (ref 4.05–5.2)
SERVICE CMNT-IMP: ABNORMAL
SERVICE CMNT-IMP: ABNORMAL
SPECIMEN EXP DATE BLD: NORMAL
WBC # BLD AUTO: 8.1 K/UL (ref 4.3–11.1)

## 2018-09-14 PROCEDURE — 77030018846 HC SOL IRR STRL H20 ICUM -A: Performed by: OBSTETRICS & GYNECOLOGY

## 2018-09-14 PROCEDURE — 77030002888 HC SUT CHRMC J&J -A: Performed by: OBSTETRICS & GYNECOLOGY

## 2018-09-14 PROCEDURE — 65270000029 HC RM PRIVATE

## 2018-09-14 PROCEDURE — 74011250636 HC RX REV CODE- 250/636

## 2018-09-14 PROCEDURE — 74011250637 HC RX REV CODE- 250/637: Performed by: OBSTETRICS & GYNECOLOGY

## 2018-09-14 PROCEDURE — 75410000003 HC RECOV DEL/VAG/CSECN EA 0.5 HR: Performed by: OBSTETRICS & GYNECOLOGY

## 2018-09-14 PROCEDURE — 74011000250 HC RX REV CODE- 250

## 2018-09-14 PROCEDURE — 76060000078 HC EPIDURAL ANESTHESIA: Performed by: OBSTETRICS & GYNECOLOGY

## 2018-09-14 PROCEDURE — 77030034696 HC CATH URETH FOL 2W BARD -A: Performed by: OBSTETRICS & GYNECOLOGY

## 2018-09-14 PROCEDURE — 77030018836 HC SOL IRR NACL ICUM -A: Performed by: OBSTETRICS & GYNECOLOGY

## 2018-09-14 PROCEDURE — 77030007880 HC KT SPN EPDRL BBMI -B: Performed by: ANESTHESIOLOGY

## 2018-09-14 PROCEDURE — 4A1HXCZ MONITORING OF PRODUCTS OF CONCEPTION, CARDIAC RATE, EXTERNAL APPROACH: ICD-10-PCS | Performed by: OBSTETRICS & GYNECOLOGY

## 2018-09-14 PROCEDURE — 74011250636 HC RX REV CODE- 250/636: Performed by: ANESTHESIOLOGY

## 2018-09-14 PROCEDURE — 82803 BLOOD GASES ANY COMBINATION: CPT

## 2018-09-14 PROCEDURE — 86901 BLOOD TYPING SEROLOGIC RH(D): CPT

## 2018-09-14 PROCEDURE — 77030003665 HC NDL SPN BBMI -A: Performed by: ANESTHESIOLOGY

## 2018-09-14 PROCEDURE — 77030031139 HC SUT VCRL2 J&J -A: Performed by: OBSTETRICS & GYNECOLOGY

## 2018-09-14 PROCEDURE — 85027 COMPLETE CBC AUTOMATED: CPT

## 2018-09-14 PROCEDURE — 76010000391 HC C SECN FIRST 1 HR: Performed by: OBSTETRICS & GYNECOLOGY

## 2018-09-14 PROCEDURE — 74011250637 HC RX REV CODE- 250/637: Performed by: ANESTHESIOLOGY

## 2018-09-14 PROCEDURE — 77030032490 HC SLV COMPR SCD KNE COVD -B: Performed by: OBSTETRICS & GYNECOLOGY

## 2018-09-14 PROCEDURE — 74011250636 HC RX REV CODE- 250/636: Performed by: OBSTETRICS & GYNECOLOGY

## 2018-09-14 RX ORDER — ONDANSETRON 2 MG/ML
INJECTION INTRAMUSCULAR; INTRAVENOUS AS NEEDED
Status: DISCONTINUED | OUTPATIENT
Start: 2018-09-14 | End: 2018-09-14 | Stop reason: HOSPADM

## 2018-09-14 RX ORDER — KETOROLAC TROMETHAMINE 30 MG/ML
INJECTION, SOLUTION INTRAMUSCULAR; INTRAVENOUS AS NEEDED
Status: DISCONTINUED | OUTPATIENT
Start: 2018-09-14 | End: 2018-09-14 | Stop reason: HOSPADM

## 2018-09-14 RX ORDER — KETOROLAC TROMETHAMINE 30 MG/ML
30 INJECTION, SOLUTION INTRAMUSCULAR; INTRAVENOUS
Status: DISPENSED | OUTPATIENT
Start: 2018-09-14 | End: 2018-09-15

## 2018-09-14 RX ORDER — OXYTOCIN/RINGER'S LACTATE 30/500 ML
250 PLASTIC BAG, INJECTION (ML) INTRAVENOUS ONCE
Status: DISCONTINUED | OUTPATIENT
Start: 2018-09-14 | End: 2018-09-14 | Stop reason: HOSPADM

## 2018-09-14 RX ORDER — SODIUM CHLORIDE, SODIUM LACTATE, POTASSIUM CHLORIDE, CALCIUM CHLORIDE 600; 310; 30; 20 MG/100ML; MG/100ML; MG/100ML; MG/100ML
75 INJECTION, SOLUTION INTRAVENOUS CONTINUOUS
Status: DISCONTINUED | OUTPATIENT
Start: 2018-09-14 | End: 2018-09-14 | Stop reason: HOSPADM

## 2018-09-14 RX ORDER — OXYTOCIN/RINGER'S LACTATE 30/500 ML
PLASTIC BAG, INJECTION (ML) INTRAVENOUS
Status: DISCONTINUED | OUTPATIENT
Start: 2018-09-14 | End: 2018-09-14 | Stop reason: HOSPADM

## 2018-09-14 RX ORDER — SODIUM CHLORIDE 0.9 % (FLUSH) 0.9 %
5-10 SYRINGE (ML) INJECTION AS NEEDED
Status: DISCONTINUED | OUTPATIENT
Start: 2018-09-14 | End: 2018-09-14 | Stop reason: HOSPADM

## 2018-09-14 RX ORDER — FAMOTIDINE 20 MG/1
20 TABLET, FILM COATED ORAL
Status: COMPLETED | OUTPATIENT
Start: 2018-09-14 | End: 2018-09-14

## 2018-09-14 RX ORDER — OXYCODONE HYDROCHLORIDE 5 MG/1
10 TABLET ORAL
Status: DISPENSED | OUTPATIENT
Start: 2018-09-14 | End: 2018-09-15

## 2018-09-14 RX ORDER — SODIUM CHLORIDE 0.9 % (FLUSH) 0.9 %
5-10 SYRINGE (ML) INJECTION EVERY 8 HOURS
Status: DISCONTINUED | OUTPATIENT
Start: 2018-09-14 | End: 2018-09-14 | Stop reason: HOSPADM

## 2018-09-14 RX ORDER — TRISODIUM CITRATE DIHYDRATE AND CITRIC ACID MONOHYDRATE 500; 334 MG/5ML; MG/5ML
30 SOLUTION ORAL
Status: COMPLETED | OUTPATIENT
Start: 2018-09-14 | End: 2018-09-14

## 2018-09-14 RX ORDER — NALOXONE HYDROCHLORIDE 0.4 MG/ML
0.2 INJECTION, SOLUTION INTRAMUSCULAR; INTRAVENOUS; SUBCUTANEOUS
Status: ACTIVE | OUTPATIENT
Start: 2018-09-14 | End: 2018-09-15

## 2018-09-14 RX ORDER — SIMETHICONE 80 MG
80 TABLET,CHEWABLE ORAL
Status: DISCONTINUED | OUTPATIENT
Start: 2018-09-14 | End: 2018-09-17 | Stop reason: HOSPADM

## 2018-09-14 RX ORDER — DIPHENHYDRAMINE HYDROCHLORIDE 50 MG/ML
12.5 INJECTION, SOLUTION INTRAMUSCULAR; INTRAVENOUS
Status: ACTIVE | OUTPATIENT
Start: 2018-09-14 | End: 2018-09-15

## 2018-09-14 RX ORDER — HYDROMORPHONE HYDROCHLORIDE 2 MG/ML
1 INJECTION, SOLUTION INTRAMUSCULAR; INTRAVENOUS; SUBCUTANEOUS
Status: DISPENSED | OUTPATIENT
Start: 2018-09-14 | End: 2018-09-15

## 2018-09-14 RX ORDER — CEFAZOLIN SODIUM/WATER 2 G/20 ML
2 SYRINGE (ML) INTRAVENOUS ONCE
Status: COMPLETED | OUTPATIENT
Start: 2018-09-14 | End: 2018-09-14

## 2018-09-14 RX ORDER — SODIUM CHLORIDE, SODIUM LACTATE, POTASSIUM CHLORIDE, CALCIUM CHLORIDE 600; 310; 30; 20 MG/100ML; MG/100ML; MG/100ML; MG/100ML
25 INJECTION, SOLUTION INTRAVENOUS CONTINUOUS
Status: DISCONTINUED | OUTPATIENT
Start: 2018-09-14 | End: 2018-09-16

## 2018-09-14 RX ORDER — MORPHINE SULFATE 0.5 MG/ML
INJECTION, SOLUTION EPIDURAL; INTRATHECAL; INTRAVENOUS AS NEEDED
Status: DISCONTINUED | OUTPATIENT
Start: 2018-09-14 | End: 2018-09-14 | Stop reason: HOSPADM

## 2018-09-14 RX ORDER — DEXTROSE, SODIUM CHLORIDE, SODIUM LACTATE, POTASSIUM CHLORIDE, AND CALCIUM CHLORIDE 5; .6; .31; .03; .02 G/100ML; G/100ML; G/100ML; G/100ML; G/100ML
125 INJECTION, SOLUTION INTRAVENOUS CONTINUOUS
Status: DISCONTINUED | OUTPATIENT
Start: 2018-09-14 | End: 2018-09-14 | Stop reason: HOSPADM

## 2018-09-14 RX ORDER — BUPIVACAINE HYDROCHLORIDE 7.5 MG/ML
INJECTION, SOLUTION INTRASPINAL AS NEEDED
Status: DISCONTINUED | OUTPATIENT
Start: 2018-09-14 | End: 2018-09-14 | Stop reason: HOSPADM

## 2018-09-14 RX ORDER — EPHEDRINE SULFATE 50 MG/ML
INJECTION, SOLUTION INTRAVENOUS AS NEEDED
Status: DISCONTINUED | OUTPATIENT
Start: 2018-09-14 | End: 2018-09-14 | Stop reason: HOSPADM

## 2018-09-14 RX ADMIN — SODIUM CHLORIDE, SODIUM LACTATE, POTASSIUM CHLORIDE, AND CALCIUM CHLORIDE: 600; 310; 30; 20 INJECTION, SOLUTION INTRAVENOUS at 07:25

## 2018-09-14 RX ADMIN — OXYCODONE HYDROCHLORIDE 10 MG: 5 TABLET ORAL at 19:07

## 2018-09-14 RX ADMIN — KETOROLAC TROMETHAMINE 30 MG: 30 INJECTION, SOLUTION INTRAMUSCULAR at 19:05

## 2018-09-14 RX ADMIN — ONDANSETRON 4 MG: 2 INJECTION INTRAMUSCULAR; INTRAVENOUS at 09:08

## 2018-09-14 RX ADMIN — MORPHINE SULFATE 0.15 MCG: 0.5 INJECTION, SOLUTION EPIDURAL; INTRATHECAL; INTRAVENOUS at 08:51

## 2018-09-14 RX ADMIN — EPHEDRINE SULFATE 15 MG: 50 INJECTION, SOLUTION INTRAVENOUS at 09:18

## 2018-09-14 RX ADMIN — OXYCODONE HYDROCHLORIDE 10 MG: 5 TABLET ORAL at 12:16

## 2018-09-14 RX ADMIN — SODIUM CHLORIDE, SODIUM LACTATE, POTASSIUM CHLORIDE, AND CALCIUM CHLORIDE: 600; 310; 30; 20 INJECTION, SOLUTION INTRAVENOUS at 09:25

## 2018-09-14 RX ADMIN — SODIUM CHLORIDE, SODIUM LACTATE, POTASSIUM CHLORIDE, AND CALCIUM CHLORIDE 75 ML/HR: 600; 310; 30; 20 INJECTION, SOLUTION INTRAVENOUS at 08:15

## 2018-09-14 RX ADMIN — SIMETHICONE CHEW TAB 80 MG 80 MG: 80 TABLET ORAL at 12:16

## 2018-09-14 RX ADMIN — KETOROLAC TROMETHAMINE 30 MG: 30 INJECTION, SOLUTION INTRAMUSCULAR; INTRAVENOUS at 09:24

## 2018-09-14 RX ADMIN — Medication 300 ML/HR: at 09:08

## 2018-09-14 RX ADMIN — FAMOTIDINE 20 MG: 20 TABLET ORAL at 08:20

## 2018-09-14 RX ADMIN — SODIUM CITRATE AND CITRIC ACID MONOHYDRATE 30 ML: 500; 334 SOLUTION ORAL at 08:20

## 2018-09-14 RX ADMIN — BUPIVACAINE HYDROCHLORIDE 1.6 ML: 7.5 INJECTION, SOLUTION INTRASPINAL at 08:51

## 2018-09-14 RX ADMIN — Medication 2 G: at 08:45

## 2018-09-14 NOTE — LACTATION NOTE

## 2018-09-14 NOTE — PROGRESS NOTES
Pt presented for scheduled . Pt states she had a phone interview with [address] [place] [address] RN and the information that was obtained has not changed. POC reviewed. IV started, Consents witnessed. Lab work drawn, sent to lab.

## 2018-09-14 NOTE — OP NOTES
Joanna [facility]ondelet [facility]  [id number]      INTRAUTERINE PREGNANCY  SECTION FULL OP NOTE        DATE OF PROCEDURE:  2018    PREOPERATIVE DIAGNOSIS:  jennifer 2018 Repeat  Section (#4)    POSTOPERATIVE DIAGNOSIS:  S/A with viable baby girl     ADDITIONAL DIAGNOSES: none    PROCEDURE: Low transverse  section    SURGEON:  Olive Smith MD    ASSISTANT:  Gris Rey DO    ANESTHESIA: Spinal    EBL: 062 cc    COMPLICATIONS: none    OPERATIVE PROCEDURE: Patient was placed on the operating room table in the supine position/left lateral tilt. Time out was done to confirm the operating procedure, surgeon, patient and site. Once confirmed by the team, procedure was started. After having adequate regional anesthesia by spinal injection, the patient was prepped and draped in the usual fashion for abdominal surgery. A Pfannenstiel incision was made and carried down sharply through the skin, subcutaneous tissue, and fascia. Fascia was sharply dissected free from underlying rectus muscles. The peritoneum was sharply entered and extended vertically. DeLee bladder blade was then placed over the bladder. The visceroperitoneal reflection over the lower uterine segment was incised transversely and the bladder flap sharply and bluntly developed. The uterus was incised transversely, then extended bluntly, and the infants head was delivered without difficulty and fundal pressure. Fluid was clear. Cord was clamped and cut. Mouth and nose were suctioned clean. The infant was given to the NICU personnel present at the time of delivery. The placenta was expressed, intact on inspection. The uterus was exteriorized, wrapped in a wet lap square, curetted with a dry square, and closed in a double-layered fashion with #1 chromic. Hemostasis appeared adequate. The cul-de-sac was then irrigated and suctioned clean. The uterus was placed in the abdomen. The gutters were irrigated and suctioned clean.  The peritoneum and rectus muscles were closed with 2-0 chromic. The fascia was closed with 0 vicryl. Running 2-0 plain was used to reapproximate the subcutaneous tissue. 4-0 Vicryl was used in a subcuticular stitch. The patient tolerated the procedure well and went to the recovery room in satisfactory condition.

## 2018-09-14 NOTE — PROGRESS NOTES
Report received from [address] [place] [address]. Care assumed. Requests pain medication for incisional pain and headache. Assured that pain medication would be given now.

## 2018-09-14 NOTE — L&D DELIVERY NOTE
Delivery Summary    Patient: Crystal Crawford MRN: [id number]  SSN: [id number]    YOB: 1980  Age: 41 y.o. Sex: female        Information for the patient's :  Obdulia Palmer [[id number]]       Labor Events:    Labor: No   Rupture Date:     Rupture Time:     Rupture Type AROM   Amniotic Fluid Volume: Moderate    Amniotic Fluid Description: Clear None   Induction: None       Augmentation: None   Labor Events: None     Cervical Ripening:     None     Delivery Events:  Episiotomy: None   Laceration(s): None     Repaired:      Number of Repair Packets:     Suture Type and Size:       Estimated Blood Loss (ml): 800.00ml       Delivery Date: 2018    Delivery Time: 9:06 AM  Delivery Type: , Low Transverse   Details    Trial of Labor: No   Primary/Repeat: Repeat   Priority: Routine   Indications:  Prior Uterine Surgery   Incision type:     Sex:  Female     Gestational Age: 36w3d  Delivery Clinician:  Edvin Toscano  Living Status: Living   Delivery Location: OR            APGARS  One minute Five minutes Ten minutes   Skin color: 1   1        Heart rate: 2   2        Grimace: 2   2        Muscle tone: 2   2        Breathin   2        Totals: 9   9          Presentation: Vertex    Position:        Resuscitation Method:  Suctioning-bulb; Tactile Stimulation     Meconium Stained: None      Cord Vessels: 3 Vessels      Cord Events:    Cord Blood Sent?:  Yes    Blood Gases Sent?:  Yes    Placenta:  Date/Time:   9:06 AM  Removal: Expressed      Appearance: Intact; Normal     [place] Measurements:  Birth Weight: 3.295 kg      Birth Length: 51 cm      Head Circumference: 35 cm      Chest Circumference: 33.5 cm     Abdominal Girth:       Other Providers:   Chandu ORO BARBARA J;ISIAH BROCK;SHASHA DILLON;ALYSA LANDRY;SHAYY WILKERSON;DIANELYS SIERRA, Obstetrician;Primary Nurse;Primary  Nurse;Neonatologist;Anesthesiologist;Crna;Respiratory Therapist             Group B Strep:   Lab Results   Component Value Date/Time    GrBStrep, External negative 2014     Information for the patient's :  Khoa Lopez [[id number]]   No results found for: ABORH, PCTABR, PCTDIG, BILI, ABORHEXT, ABORH    Lab Results   Component Value Date/Time    APH 7.351 (H) 2018 09:06 AM    APCO2 47 2018 09:06 AM    APO2 18 2018 09:06 AM    AHCO3 26 2018 09:06 AM    ABDC 0.5 2018 09:06 AM    EPHV 7.423 2018 09:06 AM    PCO2V 35 2018 09:06 AM    PO2V 36 2018 09:06 AM    HCO3V 22 2018 09:06 AM    EBDV 1.4 (L) 2018 09:06 AM    SITE CORD 2018 09:06 AM    SITE CORD 2018 09:06 AM    RSCOM na at 2018 9 18 05 AM. Not read back. 2018 09:06 AM    RSCOM na at 2018 9 20 04 AM. Not read back.  2018 09:06 AM

## 2018-09-14 NOTE — IP AVS SNAPSHOT
[address] Lela Ornelas  
[phone] Patient: Reji Roe MRN: [id number] About your hospitalization You were admitted on:  2018 You last received care in the:  [address] [facility] You were discharged on:  2018 Why you were hospitalized Your primary diagnosis was:  H/O  Section Your diagnoses also included:  39 Weeks Gestation Of Pregnancy Follow-up Information Follow up With Details Comments Contact Info Paulie Husbands Alt, DO In 2 weeks  [address] [facility] [address] [facility], [place], operated by [facility] [address] 
[phone] Your Scheduled Appointments   1:45 PM EDT PostPartum 2 week with Merline Rover, MD  
[facility] ([facility]) [address] [place] [phone]  
[phone] 2018  9:15 AM EDT POSTPARTUM 6 WEEK with Merline Rover, MD  
[facility] ([facility]) [address] [place] [id number] [phone] Discharge Orders Procedure Order Date Status Priority Quantity Spec Type Associated Dx CALL YOUR DOCTOR For: Other Call for fever >100.4, vaginal bleeding > 1 pad per hour, s&s of wound infection 18 0903 Normal Routine 1  Maternal care due to low transverse uterine scar from previous  delivery [[id number]] Comments:  Call for fever >100.4, vaginal bleeding > 1 pad per hour, s&s of wound infection Questions: For:  Other ACTIVITY AFTER DISCHARGE Patient should: Restrict driving Pelvic Rest  0903 Normal Routine 1  Maternal care due to low transverse uterine scar from previous  delivery [[id number]] Comments:  Pelvic Rest  
  Questions: Patient should:  Restrict driving DIET REGULAR 18 0903 Normal Routine 1  Maternal care due to low transverse uterine scar from previous  delivery [[id number]] A check yefri indicates which time of day the medication should be taken. My Medications START taking these medications Instructions Each Dose to Equal  
 Morning Noon Evening Bedtime  
 ibuprofen 800 mg tablet Commonly known as:  MOTRIN Your last dose was: Your next dose is: Take 1 Tab by mouth every eight (8) hours as needed. 800 mg  
    
   
   
   
  
 oxyCODONE-acetaminophen 7.5-325 mg per tablet Commonly known as:  PERCOCET 7.5 Your last dose was: Your next dose is: Take 1-2 Tabs by mouth every six (6) hours as needed. Max Daily Amount: 8 Tabs. 1-2 Tab CONTINUE taking these medications Instructions Each Dose to Equal  
 Morning Noon Evening Bedtime  
 butalbital-acetaminophen-caffeine -40 mg per tablet Commonly known as:  Othelia Rising Your last dose was: Your next dose is:    
   
   
 1-2 tab po q 6 hrs prn headache  
     
   
   
   
  
 doxylamine succinate 25 mg tablet Commonly known as:  Viri Do Your last dose was: Your next dose is: Take 25 mg by mouth nightly as needed for Sleep. 25 mg Ferrous Sulfate 47.5 mg iron Tber tablet Commonly known as:  SLOW FE  
   
Your last dose was: Your next dose is: Take 1 Tab by mouth daily. 1 Tab NexIUM 40 mg capsule Generic drug:  esomeprazole Your last dose was: Your next dose is: Take  by mouth daily. PRENATAL S 27 mg iron- 0.8 mg Tab tablet Generic drug:  prenatal vit-iron fumarate-fa Your last dose was: Your next dose is: Take 1 Tab by mouth daily. 1 Tab  
    
   
   
   
  
 raNITIdine 75 mg Tab Commonly known as:  ZANTAC Your last dose was: Your next dose is: Take 75 mg by mouth two (2) times a day. 75 mg  
    
   
   
   
  
 TYLENOL 325 mg tablet Generic drug:  acetaminophen Your last dose was: Your next dose is: Take  by mouth every four (4) hours as needed for Pain. VITAMIN D3 1,000 unit Cap Generic drug:  cholecalciferol Your last dose was: Your next dose is: Take  by mouth daily. Where to Get Your Medications These medications were sent to [address] [place] [address] [employer] Phone:  [phone]  
  ibuprofen 800 mg tablet Information on where to get these meds will be given to you by the nurse or doctor. ! Ask your nurse or doctor about these medications  
  oxyCODONE-acetaminophen 7.5-325 mg per tablet Opioid Education Prescription Opioids: What You Need to Know: 
 
Prescription opioids can be used to help relieve moderate-to-severe pain and are often prescribed following a surgery or injury, or for certain health conditions. These medications can be an important part of treatment but also come with serious risks. Opioids are strong pain medicines. Examples include hydrocodone, oxycodone, fentanyl, and morphine. Heroin is an example of an illegal opioid. It is important to work with your health care provider to make sure you are getting the safest, most effective care. WHAT ARE THE RISKS AND SIDE EFFECTS OF OPIOID USE? Prescription opioids carry serious risks of addiction and overdose, especially with prolonged use. An opioid overdose, often marked by slow breathing, can cause sudden death. The use of prescription opioids can have a number of side effects as well, even when taken as directed. · Tolerance-meaning you might need to take more of a medication for the same pain relief · Physical dependence-meaning you have symptoms of withdrawal when the medication is stopped. Withdrawal symptoms can include nausea, sweating, chills, diarrhea, stomach cramps, and muscle aches. Withdrawal can last up to several weeks, depending on which drug you took and how long you took it. · Increased sensitivity to pain · Constipation · Nausea, vomiting, and dry mouth · Sleepiness and dizziness · Confusion · Depression · Low levels of testosterone that can result in lower sex drive, energy, and strength · Itching and sweating RISKS ARE GREATER WITH:      
· History of drug misuse, substance use disorder, or overdose · Mental health conditions (such as depression or anxiety) · Sleep apnea · Older age (72 years or older) · Pregnancy Avoid alcohol while taking prescription opioids. Also, unless specifically advised by your health care provider, medications to avoid include: · Benzodiazepines (such as Xanax or Valium) · Muscle relaxants (such as Soma or Flexeril) · Hypnotics (such as Ambien or Lunesta) · Other prescription opioids KNOW YOUR OPTIONS Talk to your health care provider about ways to manage your pain that don't involve prescription opioids. Some of these options may actually work better and have fewer risks and side effects. Options may include: 
· Pain relievers such as acetaminophen, ibuprofen, and naproxen · Some medications that are also used for depression or seizures · Physical therapy and exercise · Counseling to help patients learn how to cope better with triggers of pain and stress. · Application of heat or cold compress · Massage therapy · Relaxation techniques Be Informed Make sure you know the name of your medication, how much and how often to take it, and its potential risks & side effects.  
 
IF YOU ARE PRESCRIBED OPIOIDS FOR PAIN: 
 · Never take opioids in greater amounts or more often than prescribed. Remember the goal is not to be pain-free but to manage your pain at a tolerable level. · Follow up with your primary care provider to: · Work together to create a plan on how to manage your pain. · Talk about ways to help manage your pain that don't involve prescription opioids. · Talk about any and all concerns and side effects. · Help prevent misuse and abuse. · Never sell or share prescription opioids · Help prevent misuse and abuse. · Store prescription opioids in a secure place and out of reach of others (this may include visitors, children, friends, and family). · Safely dispose of unused/unwanted prescription opioids: Find your community drug take-back program or your pharmacy mail-back program, or flush them down the toilet, following guidance from the Food and Drug Administration (www.fda.gov/Drugs/ResourcesForYou). · Visit www.cdc.gov/drugoverdose to learn about the risks of opioid abuse and overdose. · If you believe you may be struggling with addiction, tell your health care provider and ask for guidance or call [address] [place], [place] [address]rose Armando at [phone]. Discharge Instructions Discharge instruction to follow: Activity: Pelvis rest for 6 weeks No heavy lifting over 15 lbs for 2 weeks No driving for 2 weeks No push/pull motion such as sweeping or vacuuming for 2 weeks No tub baths for 6 weeks  section keep incision clean and dry, may shower as normal with soap and water. Inspect incision every day for signs of infection listed below. Continue to use rosario-bottle with every void or bowel movement until comfortable stopping. Change sanitary pad after each urination or bowel movement.  
 
Call MD for the following: 
    Fever over 101 F; pain not relieved by medication; foul smelling vaginal discharge or increase in vaginal bleeding. Redness, swelling, or drainage from  incision. Take medication as prescribed. Follow up with MD as order.  Section: What to Expect at [facility] Your Recovery A  section, or , is surgery to deliver your baby through a cut, called an incision, that the doctor makes in your lower belly and uterus. You may have some pain in your lower belly and need pain medicine for 1 to 2 weeks. You can expect some vaginal bleeding for several weeks. You will probably need about 6 weeks to fully recover. It is important to take it easy while the incision is healing. Avoid heavy lifting, strenuous activities, or exercises that strain the belly muscles while you are recovering. Ask a family member or friend for help with housework, cooking, and shopping. This care sheet gives you a general idea about how long it will take for you to recover. But each person recovers at a different pace. Follow the steps below to get better as quickly as possible. How can you care for yourself at home? Activity 
  · Rest when you feel tired. Getting enough sleep will help you recover.  
  · Try to walk each day. Start by walking a little more than you did the day before. Bit by bit, increase the amount you walk. Walking boosts blood flow and helps prevent pneumonia, constipation, and blood clots.  
  · Avoid strenuous activities, such as bicycle riding, jogging, weightlifting, and aerobic exercise, for 6 weeks or until your doctor says it is okay.  
  · Until your doctor says it is okay, do not lift anything heavier than your baby.  
  · Do not do sit-ups or other exercises that strain the belly muscles for 6 weeks or until your doctor says it is okay.  
  · Hold a pillow over your incision when you cough or take deep breaths. This will support your belly and decrease your pain.  
  · You may shower as usual. Pat the incision dry when you are done.   · You will have some vaginal bleeding. Wear sanitary pads. Do not douche or use tampons until your doctor says it is okay.  
  · Ask your doctor when you can drive again.  
  · You will probably need to take at least 6 weeks off work. It depends on the type of work you do and how you feel.  
  · Ask your doctor when it is okay for you to have sex. Diet 
  · You can eat your normal diet. If your stomach is upset, try bland, low-fat foods like plain rice, broiled chicken, toast, and yogurt.  
  · Drink plenty of fluids (unless your doctor tells you not to).  
  · You may notice that your bowel movements are not regular right after your surgery. This is common. Try to avoid constipation and straining with bowel movements. You may want to take a fiber supplement every day. If you have not had a bowel movement after a couple of days, ask your doctor about taking a mild laxative.  
  · If you are breastfeeding, do not drink any alcohol. Medicines 
  · Your doctor will tell you if and when you can restart your medicines. He or she will also give you instructions about taking any new medicines.  
  · If you take blood thinners, such as warfarin (Coumadin), clopidogrel (Plavix), or aspirin, be sure to talk to your doctor. He or she will tell you if and when to start taking those medicines again. Make sure that you understand exactly what your doctor wants you to do.  
  · Take pain medicines exactly as directed. ¨ If the doctor gave you a prescription medicine for pain, take it as prescribed. ¨ If you are not taking a prescription pain medicine, ask your doctor if you can take an over-the-counter medicine.  
  · If you think your pain medicine is making you sick to your stomach: 
¨ Take your medicine after meals (unless your doctor has told you not to). ¨ Ask your doctor for a different pain medicine.  
  · If your doctor prescribed antibiotics, take them as directed.  Do not stop taking them just because you feel better. You need to take the full course of antibiotics. Incision care 
  · If you have strips of tape on the incision, leave the tape on for a week or until it falls off.  
  · Wash the area daily with warm, soapy water, and pat it dry. Don't use hydrogen peroxide or alcohol, which can slow healing. You may cover the area with a gauze bandage if it weeps or rubs against clothing. Change the bandage every day.  
  · Keep the area clean and dry. Other instructions 
  · If you breastfeed your baby, you may be more comfortable while you are healing if you place the baby so that he or she is not resting on your belly. Try tucking your baby under your arm, with his or her body along the side you will be feeding on. Support your baby's upper body with your arm. With that hand you can control your baby's head to bring his or her mouth to your breast. This is sometimes called the [employer] hold. Follow-up care is a key part of your treatment and safety. Be sure to make and go to all appointments, and call your doctor if you are having problems. It's also a good idea to know your test results and keep a list of the medicines you take. When should you call for help? Call 911 anytime you think you may need emergency care. For example, call if: 
  · You passed out (lost consciousness).  
  · You have chest pain, are short of breath, or cough up blood.  
 Call your doctor now or seek immediate medical care if: 
  · You have pain that does not get better after you take pain medicine.  
  · You have severe vaginal bleeding.  
  · You are dizzy or lightheaded, or you feel like you may faint.  
  · You have new or worse pain in your belly or pelvis.  
  · You have loose stitches, or your incision comes open.  
  · You have symptoms of infection, such as: 
¨ Increased pain, swelling, warmth, or redness. ¨ Red streaks leading from the incision. ¨ Pus draining from the incision. ¨ A fever.  
  · You have symptoms of a blood clot in your leg (called a deep vein thrombosis), such as: 
¨ Pain in your calf, back of the knee, thigh, or groin. ¨ Redness and swelling in your leg or groin.  
 Watch closely for changes in your health, and be sure to contact your doctor if: 
  · You do not get better as expected. Where can you learn more? Go to http://renetta-candida.info/. Enter M806 in the search box to learn more about \" Section: What to Expect at Home. \" Current as of: 2017 Content Version: 11.7 © 8463-9732 whoactually. Care instructions adapted under license by [employer] (which disclaims liability or warranty for this information). If you have questions about a medical condition or this instruction, always ask your healthcare professional. [place] [address] any warranty or liability for your use of this information. [employer] Announcement We are excited to announce that we are making your provider's discharge notes available to you in [employer]. You will see these notes when they are completed and signed by the physician that discharged you from your recent hospital stay. If you have any questions or concerns about any information you see in [employer], please call the Health Information Department where you were seen or reach out to your Primary Care Provider for more information about your plan of care. Introducing [facility]! [employer] introduces [employer] patient portal. Now you can access parts of your medical record, email your doctor's office, and request medication refills online. 1. In your internet browser, go to [email or web address] [employer] (TEN)/[employer] 2. Click on the First Time User? Click Here link in the Sign In box. You will see the New Member Sign Up page. 3. Enter your [employer] Access Code exactly as it appears below.  You will not need to use this code after youve completed the sign-up process. If you do not sign up before the expiration date, you must request a new code. · [employer] Access Code: [email or web address] Expires: 10/31/2018  1:51 PM 
 
4. Enter the last four digits of your Social Security Number (xxxx) and Date of Birth (mm/dd/yyyy) as indicated and click Submit. You will be taken to the next sign-up page. 5. Create a [employer] ID. This will be your [employer] login ID and cannot be changed, so think of one that is secure and easy to remember. 6. Create a [employer] password. You can change your password at any time. 7. Enter your Password Reset Question and Answer. This can be used at a later time if you forget your password. 8. Enter your e-mail address. You will receive e-mail notification when new information is available in [address] 9. Click Sign Up. You can now view and download portions of your medical record. 10. Click the Download Summary menu link to download a portable copy of your medical information. If you have questions, please visit the Frequently Asked Questions section of the [employer] website. Remember, [employer] is NOT to be used for urgent needs. For medical emergencies, dial 911. Now available from your iPhone and Android! Introducing Edgardo Gordillo As a [facility] patient, I wanted to make you aware of our electronic visit tool called Edgardo Gordillo. [facility] 24/7 allows you to connect within minutes with a medical provider 24 hours a day, seven days a week via a mobile device or tablet or logging into a secure website from your computer. You can access Edgardo Gordillo from anywhere in the [place].  
 
A virtual visit might be right for you when you have a simple condition and feel like you just dont want to get out of bed, or cant get away from work for an appointment, when your regular [facility] provider is not available (evenings, weekends or holidays), or when youre out of town and need minor care. Electronic visits cost only $49 and if the [employer] 24/7 provider determines a prescription is needed to treat your condition, one can be electronically transmitted to a nearby pharmacy*. Please take a moment to enroll today if you have not already done so. The enrollment process is free and takes just a few minutes. To enroll, please download the [employer] 24/7 kaylah to your tablet or phone, or visit [email or web address] to enroll on your computer. And, as an [address] [place] [address] patient with a [employer] account, the results of your visits will be scanned into your electronic medical record and your primary care provider will be able to view the scanned results. We urge you to continue to see your regular [employer] provider for your ongoing medical care. And while your primary care provider may not be the one available when you seek a [employer] virtual visit, the peace of mind you get from getting a real diagnosis real time can be priceless. For more information on [employer], view our Frequently Asked Questions (FAQs) at [email or web address]. Sincerely, 
 
Ez Pruitt MD 
Chief Medical Officer [address] [place] [address] *:  certain medications cannot be prescribed via [employer] Providers Seen During Your Hospitalization Provider Specialty Primary office phone Madelyn Jolly MD Obstetrics & Gynecology [phone] Immunizations Administered for This Admission Name Date Influenza Vaccine (Quad) PF 9/16/2018 Tdap 9/16/2018 Your Primary Care Physician (PCP) Primary Care Physician Office Phone Office Fax Momo Campbell [phone] You are allergic to the following Allergen Reactions Sulfa (Sulfonamide Antibiotics) Hives Sulfanilamide Hives Recent Documentation Height Weight Breastfeeding? BMI OB Status Smoking Status 1.6 m 76.7 kg Yes 29.94 kg/m2 Recent pregnancy Never Smoker Emergency Contacts Name Discharge Info Relation Home Work Mobile Chilango Pollack  Spouse [3]   [phone] Patient Belongings The following personal items are in your possession at time of discharge: 
  Dental Appliances: None  Visual Aid: Glasses, With patient      Home Medications: None   Jewelry: None  Clothing: At bedside    Other Valuables: Cell Phone, Chino Villalta, Nikolas [facility]  Personal Items Sent to Safe: none Please provide this summary of care documentation to your next provider. Signatures-by signing, you are acknowledging that this After Visit Summary has been reviewed with you and you have received a copy. Patient Signature:  ____________________________________________________________ Date:  ____________________________________________________________  
  
Dale Crowley Provider Signature:  ____________________________________________________________ Date:  ____________________________________________________________

## 2018-09-14 NOTE — PROGRESS NOTES
SBAR OUT Report: Mother Verbal report given to [address] [place] [address] (full name & credentials) on this patient, who is now being transferred to MIU (unit) for routine progression of care. The patient is wearing a green \"Anesthesia-Duramorph\" band. Report consisted of patient's Situation, Background, Assessment and Recommendations (SBAR).  ID bands were compared with the identification form, and verified with the patient and receiving nurse. Information from the SBAR and the [address] [place] [address] Report was reviewed with the receiving nurse; opportunity for questions and clarification provided.

## 2018-09-14 NOTE — LACTATION NOTE
This note was copied from a baby's chart. 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 Baby just born this morning. Has fed well x 2 but sleepy for past few hours. Mom eager to get baby to feed. Baby sleepy. Changed stool diaper, baby roused some. Assisted with latch attempt on left breast. Mom has everted nipples, baby opens mouth well, latched for 5-10 sucks but then came off and had large emesis. Continued to gag and had 2nd emesis. Showed mom bulb syringe if needed. Encouraged mom to let baby rest for a while now and then attempt again at the breast or when baby showing feeding cues again. Reviewed expectations for first 24 hours. Mom confident. RN updated. Lactation to follow up tomorrow.

## 2018-09-14 NOTE — ANESTHESIA POSTPROCEDURE EVALUATION
Post-Anesthesia Evaluation and Assessment Patient: Crystal Crawford MRN: [id number]  SSN: [id number] YOB: 1980  Age: 41 y.o. Sex: female Cardiovascular Function/Vital Signs Visit Vitals  BP 95/57  Pulse 81  Temp 36.7 °C (98 °F)  Resp 20  
 Ht 5' 3\" (1.6 m)  Wt 76.7 kg (169 lb)  SpO2 100%  Breastfeeding Yes  BMI 29.94 kg/m2 Patient is status post spinal anesthesia for Procedure(s):  SECTION. Nausea/Vomiting: None Postoperative hydration reviewed and adequate. Pain: 
Pain Scale 1: Numeric (0 - 10) (18 1217) Pain Intensity 1: 4 (18 121) Managed Neurological Status:  
Neuro (WDL): Within Defined Limits (18 1125) At baseline Mental Status and Level of Consciousness: Arousable Pulmonary Status:  
O2 Device: Room air (18 0955) Adequate oxygenation and airway patent Complications related to anesthesia: None Post-anesthesia assessment completed. No concerns Signed By: Danielle Dawn MD   
 2018

## 2018-09-14 NOTE — PROGRESS NOTES
SBAR IN Report: Mother Verbal report received from David Simpson on this patient, who is now being transferred from L&D  for routine progression of care. The patient is wearing a green \"Anesthesia-Duramorph\" band. Report consisted of patient's Situation, Background, Assessment and Recommendations (SBAR).  ID bands were compared with the identification form, and verified with the patient and transferring nurse. Information from the SBAR and the [place] Report was reviewed with the transferring nurse; opportunity for questions and clarification provided.

## 2018-09-14 NOTE — H&P
History & Physical 
 
Name: Porfirio Zacarias MRN: [id number]  SSN: [id number] YOB: 1980  Age: 41 y.o. Sex: female Subjective:  
 
Estimated Date of Delivery: 18 OB History  Para Term  AB Living 11 3 3 0 7 2 SAB TAB Ectopic Molar Multiple Live Births 7 0 0 0 0 2 # Outcome Date GA Lbr Marcelino/2nd Weight Sex Delivery Anes PTL Lv  
11 Current 10 2017 8w0d Birth Comments: D&C required  
9 SAB 10/2016          
8 2016 10w0d         
7 2016          
6 2015          
5 2015          
4 Term 14 39w2d  3.215 kg F  LO SPINAL AN N AMBER Birth Comments: Repeat C/S  
3 Term 11 39w1d  3.28 kg F  LO SPINAL AN  AMBER Birth Comments: Repeat C/S  
2 2010 1 Term 08 40w5d  3.629 kg M CS-LTranv Spinal N Birth Comments: Failed Induction Obstetric Comments MAB x 1  
SAB x 6 Ms. Arville Leventhal is admitted with pregnancy at 39w1d for  section due to previous  section. Prenatal course was normal. Please see prenatal records for details. Past Medical History:  
Diagnosis Date  Abnormal Pap smear  Anemia NEC   
 Crohn's disease ([address]  Headache(784.0)  Miscarriage   
 x 6  
 Missed  05/10/2017  
 10 week MAB  Perirectal abscess Past Surgical History:  
Procedure Laterality Date   DELIVERY ONLY    
 HX ADENOIDECTOMY  HX  SECTION    
 x 3  
 HX DILATION AND CURETTAGE  05/10/2017  
 10 week MAB [address]  HX OTHER SURGICAL    
 cesarian section  HX OTHER SURGICAL  2011  
 rectal absess and correction in   HX TONSILLECTOMY  HX WISDOM TEETH EXTRACTION    
 REPAIR RECTO-VAG FISTULA    
 -during pregnancy Social History Occupational History  Not on file. Social History Main Topics  Smoking status: Never Smoker  Smokeless tobacco: Never Used  Alcohol use No  
 Drug use: No  
 Sexual activity: Yes  
  Partners: Male Birth control/ protection: None Family History Problem Relation Age of Onset  Hypertension Father  High Cholesterol Father  High Cholesterol Mother Allergies Allergen Reactions  Sulfa (Sulfonamide Antibiotics) Hives  Sulfanilamide Hives Prior to Admission medications Medication Sig Start Date End Date Taking? Authorizing Provider  
butalbital-acetaminophen-caffeine (FIORICET, ESGIC) -40 mg per tablet 1-2 tab po q 6 hrs prn headache 8/28/18  Yes Walter Hanson MD  
Ferrous Sulfate (SLOW FE) 47.5 mg iron TbER tablet Take 1 Tab by mouth daily. 8/15/18  Yes Richard Perez MD  
esomeprazole (NEXIUM) 40 mg capsule Take  by mouth daily. Yes Historical Provider  
raNITIdine (ZANTAC) 75 mg tablet Take 75 mg by mouth two (2) times a day. Yes Historical Provider  
acetaminophen (TYLENOL) 325 mg tablet Take  by mouth every four (4) hours as needed for Pain. Yes Historical Provider  
doxylamine succinate (UNISOM) 25 mg tablet Take 25 mg by mouth nightly as needed for Sleep. Yes Historical Provider  
cholecalciferol (VITAMIN D3) 1,000 unit cap Take  by mouth daily. Yes Historical Provider  
prenatal vit-iron fumarate-fa (PRENATAL S) 27-0.8 mg Tab tablet Take 1 Tab by mouth daily. Yes Historical Provider Review of Systems Objective:  
 
Vitals: 
Vitals:  
 09/14/18 6832 09/14/18 4120 BP:  139/76 Pulse:  (!) 109 Resp:  20 Temp:  98.7 °F (37.1 °C) Weight: 76.7 kg (169 lb) Height: 5' 3\" (1.6 m) Physical Exam: 
Physical Exam 
Cervical Exam: Closed/Thick/High 
Membranes: Intact Fetal Heart Rate: Reactive Prenatal Labs:  
Lab Results Component Value Date/Time ABO/Rh(D) O POSITIVE 09/14/2018 07:20 AM  
 Rubella, External immune 02/19/2018 GrBStrep, External negative 01/30/2014 HBsAg, External negative 2018 HIV, External NR 2018 RPR, External NR 2018 Gonorrhea, External negative 03/15/2018 Chlamydia, External negative 03/15/2018 ABO,Rh O positive 2018 Impression/Plan: Active Problems: H/O  section (2018) 39 weeks gestation of pregnancy (2018) Plan:  Admit for  section. Group B Strep was negative. Discussed the risks of surgery including the risks of bleeding, infection, deep vein thrombosis, and surgical injuries to internal organs including but not limited to the bowels, bladder, rectum, and female reproductive organs. The patient understands the risks; any and all questions were answered to the patient's satisfaction. Signed By:  Shalini Cabezas MD   
 2018

## 2018-09-14 NOTE — ANESTHESIA PREPROCEDURE EVALUATION
Anesthetic History No history of anesthetic complications Review of Systems / Medical History Pertinent labs reviewed Pulmonary Within defined limits Neuro/Psych Headaches Cardiovascular Within defined limits Exercise tolerance: >4 METS 
  
GI/Hepatic/Renal 
  
 
 
 
 
 
Comments: Crohn's dz Endo/Other Anemia Other Findings Physical Exam 
 
Airway Mallampati: II 
TM Distance: 4 - 6 cm Neck ROM: normal range of motion Mouth opening: Normal 
 
 Cardiovascular Rhythm: regular Dental 
No notable dental hx Pulmonary Breath sounds clear to auscultation Abdominal 
GI exam deferred Other Findings Anesthetic Plan ASA: 2 Anesthesia type: spinal 
 
 
 
 
Induction: Intravenous Anesthetic plan and risks discussed with: Patient

## 2018-09-14 NOTE — ANESTHESIA PROCEDURE NOTES
Spinal Block Start time: 9/14/2018 8:47 AM 
End time: 9/14/2018 8:51 AM 
Performed by: Mandy Valderrama Authorized by: Mandy Valderrama Pre-procedure: Indications: primary anesthetic  Preanesthetic Checklist: patient identified, risks and benefits discussed, anesthesia consent, site marked, patient being monitored and timeout performed Timeout Time: 08:47 Spinal Block:  
Patient Position:  Seated Prep Region:  Lumbar Prep: chlorhexidine Location:  L3-4 Technique:  Single shot Local:  Lidocaine 1% (1 mL) Local Dose (mL):  1 Needle:  
Needle Type:  Pencan Needle Gauge:  25 G Attempts:  1 Events: CSF confirmed, no blood with aspiration and no paresthesia Assessment: 
Insertion:  Uncomplicated Patient tolerance:  Patient tolerated the procedure well with no immediate complications Anesthetic medications: 
Marcaine: 12 mg in 8.25% Dextrose Preservative-free morphine: 0.15 mg

## 2018-09-15 LAB
BASOPHILS # BLD: 0 K/UL (ref 0–0.2)
BASOPHILS NFR BLD: 0 % (ref 0–2)
DIFFERENTIAL METHOD BLD: ABNORMAL
EOSINOPHIL # BLD: 0.1 K/UL (ref 0–0.8)
EOSINOPHIL NFR BLD: 1 % (ref 0.5–7.8)
ERYTHROCYTE [DISTWIDTH] IN BLOOD BY AUTOMATED COUNT: 18.9 %
HCT VFR BLD AUTO: 28.3 % (ref 35.8–46.3)
HGB BLD-MCNC: 9.1 G/DL (ref 11.7–15.4)
IMM GRANULOCYTES # BLD: 0 K/UL (ref 0–0.5)
IMM GRANULOCYTES NFR BLD AUTO: 0 % (ref 0–5)
LYMPHOCYTES # BLD: 2 K/UL (ref 0.5–4.6)
LYMPHOCYTES NFR BLD: 30 % (ref 13–44)
MCH RBC QN AUTO: 30.3 PG (ref 26.1–32.9)
MCHC RBC AUTO-ENTMCNC: 32.2 G/DL (ref 31.4–35)
MCV RBC AUTO: 94.3 FL (ref 79.6–97.8)
MONOCYTES # BLD: 0.6 K/UL (ref 0.1–1.3)
MONOCYTES NFR BLD: 9 % (ref 4–12)
NEUTS SEG # BLD: 4 K/UL (ref 1.7–8.2)
NEUTS SEG NFR BLD: 59 % (ref 43–78)
NRBC # BLD: 0 K/UL (ref 0–0.2)
PLATELET # BLD AUTO: 144 K/UL (ref 150–450)
PMV BLD AUTO: 10 FL (ref 9.4–12.3)
RBC # BLD AUTO: 3 M/UL (ref 4.05–5.2)
WBC # BLD AUTO: 6.7 K/UL (ref 4.3–11.1)

## 2018-09-15 PROCEDURE — 74011250636 HC RX REV CODE- 250/636: Performed by: ANESTHESIOLOGY

## 2018-09-15 PROCEDURE — 85025 COMPLETE CBC W/AUTO DIFF WBC: CPT

## 2018-09-15 PROCEDURE — 36415 COLL VENOUS BLD VENIPUNCTURE: CPT

## 2018-09-15 PROCEDURE — 65270000029 HC RM PRIVATE

## 2018-09-15 PROCEDURE — 74011250637 HC RX REV CODE- 250/637: Performed by: ANESTHESIOLOGY

## 2018-09-15 PROCEDURE — 74011250637 HC RX REV CODE- 250/637: Performed by: OBSTETRICS & GYNECOLOGY

## 2018-09-15 RX ORDER — SODIUM CHLORIDE 0.9 % (FLUSH) 0.9 %
5-10 SYRINGE (ML) INJECTION EVERY 8 HOURS
Status: DISCONTINUED | OUTPATIENT
Start: 2018-09-15 | End: 2018-09-15 | Stop reason: ALTCHOICE

## 2018-09-15 RX ORDER — ZOLPIDEM TARTRATE 5 MG/1
5 TABLET ORAL
Status: DISCONTINUED | OUTPATIENT
Start: 2018-09-15 | End: 2018-09-17 | Stop reason: HOSPADM

## 2018-09-15 RX ORDER — IBUPROFEN 800 MG/1
800 TABLET ORAL
Status: DISCONTINUED | OUTPATIENT
Start: 2018-09-15 | End: 2018-09-17 | Stop reason: HOSPADM

## 2018-09-15 RX ORDER — OXYCODONE AND ACETAMINOPHEN 7.5; 325 MG/1; MG/1
1 TABLET ORAL
Status: DISCONTINUED | OUTPATIENT
Start: 2018-09-15 | End: 2018-09-17 | Stop reason: HOSPADM

## 2018-09-15 RX ORDER — OXYCODONE AND ACETAMINOPHEN 7.5; 325 MG/1; MG/1
2 TABLET ORAL
Status: DISCONTINUED | OUTPATIENT
Start: 2018-09-15 | End: 2018-09-17 | Stop reason: HOSPADM

## 2018-09-15 RX ORDER — DOCUSATE SODIUM 100 MG/1
100 CAPSULE, LIQUID FILLED ORAL 2 TIMES DAILY
Status: DISCONTINUED | OUTPATIENT
Start: 2018-09-15 | End: 2018-09-17 | Stop reason: HOSPADM

## 2018-09-15 RX ORDER — SODIUM CHLORIDE, SODIUM LACTATE, POTASSIUM CHLORIDE, CALCIUM CHLORIDE 600; 310; 30; 20 MG/100ML; MG/100ML; MG/100ML; MG/100ML
150 INJECTION, SOLUTION INTRAVENOUS CONTINUOUS
Status: ACTIVE | OUTPATIENT
Start: 2018-09-15 | End: 2018-09-15

## 2018-09-15 RX ORDER — OXYTOCIN/0.9 % SODIUM CHLORIDE 15/250 ML
250 PLASTIC BAG, INJECTION (ML) INTRAVENOUS ONCE
Status: ACTIVE | OUTPATIENT
Start: 2018-09-15 | End: 2018-09-15

## 2018-09-15 RX ORDER — NALOXONE HYDROCHLORIDE 0.4 MG/ML
0.4 INJECTION, SOLUTION INTRAMUSCULAR; INTRAVENOUS; SUBCUTANEOUS AS NEEDED
Status: DISCONTINUED | OUTPATIENT
Start: 2018-09-15 | End: 2018-09-17 | Stop reason: HOSPADM

## 2018-09-15 RX ORDER — SODIUM CHLORIDE 0.9 % (FLUSH) 0.9 %
5-10 SYRINGE (ML) INJECTION AS NEEDED
Status: DISCONTINUED | OUTPATIENT
Start: 2018-09-15 | End: 2018-09-17 | Stop reason: HOSPADM

## 2018-09-15 RX ORDER — DIPHENHYDRAMINE HCL 25 MG
25 CAPSULE ORAL
Status: DISCONTINUED | OUTPATIENT
Start: 2018-09-15 | End: 2018-09-17 | Stop reason: HOSPADM

## 2018-09-15 RX ADMIN — OXYCODONE HYDROCHLORIDE 10 MG: 5 TABLET ORAL at 07:01

## 2018-09-15 RX ADMIN — KETOROLAC TROMETHAMINE 30 MG: 30 INJECTION, SOLUTION INTRAMUSCULAR at 01:00

## 2018-09-15 RX ADMIN — SIMETHICONE CHEW TAB 80 MG 80 MG: 80 TABLET ORAL at 17:39

## 2018-09-15 RX ADMIN — KETOROLAC TROMETHAMINE 30 MG: 30 INJECTION, SOLUTION INTRAMUSCULAR at 07:01

## 2018-09-15 RX ADMIN — OXYCODONE HYDROCHLORIDE AND ACETAMINOPHEN 2 TABLET: 7.5; 325 TABLET ORAL at 17:38

## 2018-09-15 RX ADMIN — DOCUSATE SODIUM 100 MG: 100 CAPSULE, LIQUID FILLED ORAL at 17:39

## 2018-09-15 RX ADMIN — SIMETHICONE CHEW TAB 80 MG 80 MG: 80 TABLET ORAL at 01:01

## 2018-09-15 RX ADMIN — SIMETHICONE CHEW TAB 80 MG 80 MG: 80 TABLET ORAL at 08:51

## 2018-09-15 RX ADMIN — OXYCODONE HYDROCHLORIDE AND ACETAMINOPHEN 1 TABLET: 7.5; 325 TABLET ORAL at 12:44

## 2018-09-15 RX ADMIN — IBUPROFEN 800 MG: 800 TABLET ORAL at 20:07

## 2018-09-15 RX ADMIN — SIMETHICONE CHEW TAB 80 MG 80 MG: 80 TABLET ORAL at 12:44

## 2018-09-15 RX ADMIN — IBUPROFEN 800 MG: 800 TABLET ORAL at 12:44

## 2018-09-15 RX ADMIN — OXYCODONE HYDROCHLORIDE 10 MG: 5 TABLET ORAL at 01:00

## 2018-09-15 RX ADMIN — DOCUSATE SODIUM 100 MG: 100 CAPSULE, LIQUID FILLED ORAL at 12:44

## 2018-09-15 NOTE — PROGRESS NOTES
Report received from Elias Sousa RN care assumed. Pt sitting up in bed with no complaints at this time. Call light within reach.

## 2018-09-15 NOTE — PROGRESS NOTES
IV converted to saline lock. SCDs and fernandez catheter removed without difficulty. 300 ml clear, yellow urine obtained. To bathroom with assistance. Margaret care taught and understanding verbalized. Returned to bed. No assistance needed. Ice water provided. Encouraged to drink lots of water. Understanding verbalized.

## 2018-09-15 NOTE — LACTATION NOTE
This note was copied from a baby's chart. In to see mom and infant for follow up. Mom states infant is latching and feeding well, no issues. Reviewed \"second night of life\" and normalcy of periods of cluster feeding. No further needs or questions. Lactation to follow up tomorrow for discharge.

## 2018-09-15 NOTE — PROGRESS NOTES
Patient is POD 1 s/p  and received neuraxial morphine for post-op pain control. Visit Vitals  /70 (BP 1 Location: Right arm, BP Patient Position: At rest)  Pulse 82  Temp 36.8 °C (98.3 °F)  Resp 18  Ht 5' 3\" (1.6 m)  Wt 76.7 kg (169 lb)  LMP 2017 (Approximate)  SpO2 97%  Breastfeeding Yes  BMI 29.94 kg/m2  
, airway patent, patient appropriately hydrated and appears euvolemic. She reports minimal incisional pain. Patient reports moderate pruritis and no nausea. Her lower extremities have returned to baseline neurologically. She was satisfied with the anesthetic and reports no complications. Continue current orders.

## 2018-09-15 NOTE — PROGRESS NOTES
Bedside report completed with Denise Aaron. Plan of care reviewed with patient, verbalized understanding. Care assumed.

## 2018-09-15 NOTE — PROGRESS NOTES
Toradol IVP and Oxycodone 10 mg po given per request and for c/o pain. Simethicone given as ordered.

## 2018-09-15 NOTE — PROGRESS NOTES
Post-Operative Day Number 1 Progress Note Patient doing well post-op day 1 from  delivery without significant complaints. Pain controlled on current medication. Voiding without difficulty, normal lochia. Vitals:  Patient Vitals for the past 8 hrs: 
 BP Temp Pulse Resp SpO2  
09/15/18 0841 104/70 98.3 °F (36.8 °C) 82 18 97 % 09/15/18 0600 99/63 98.2 °F (36.8 °C) 82 20 97 % 09/15/18 0200 101/62 98.3 °F (36.8 °C) 89 18 97 % Temp (24hrs), Av.3 °F (36.8 °C), Min:97.6 °F (36.4 °C), Max:99.1 °F (37.3 °C) Vital signs stable, afebrile. Exam:  Patient without distress. Abdomen soft, fundus firm at level of umbilicus, non tender. Incision dry and clean without erythema. Lower extremities are negative for swelling, cords or tenderness. Lab/Data Review: CBC:  
Lab Results Component Value Date/Time WBC 6.7 09/15/2018 05:35 AM  
 HGB 9.1 (L) 09/15/2018 05:35 AM  
 HCT 28.3 (L) 09/15/2018 05:35 AM  
  (L) 09/15/2018 05:35 AM  
 
 
Assessment and Plan:  Patient appears to be having uncomplicated post- course. Continue routine post-op care and maternal education.

## 2018-09-16 PROCEDURE — 74011250636 HC RX REV CODE- 250/636: Performed by: OBSTETRICS & GYNECOLOGY

## 2018-09-16 PROCEDURE — 90715 TDAP VACCINE 7 YRS/> IM: CPT | Performed by: OBSTETRICS & GYNECOLOGY

## 2018-09-16 PROCEDURE — 90471 IMMUNIZATION ADMIN: CPT

## 2018-09-16 PROCEDURE — 65270000029 HC RM PRIVATE

## 2018-09-16 PROCEDURE — 74011250637 HC RX REV CODE- 250/637: Performed by: OBSTETRICS & GYNECOLOGY

## 2018-09-16 PROCEDURE — 90686 IIV4 VACC NO PRSV 0.5 ML IM: CPT | Performed by: OBSTETRICS & GYNECOLOGY

## 2018-09-16 RX ORDER — IBUPROFEN 800 MG/1
800 TABLET ORAL
Qty: 30 TAB | Refills: 0 | Status: SHIPPED | OUTPATIENT
Start: 2018-09-16

## 2018-09-16 RX ORDER — OXYCODONE AND ACETAMINOPHEN 7.5; 325 MG/1; MG/1
1-2 TABLET ORAL
Qty: 30 TAB | Refills: 0 | Status: SHIPPED | OUTPATIENT
Start: 2018-09-16

## 2018-09-16 RX ADMIN — OXYCODONE HYDROCHLORIDE AND ACETAMINOPHEN 2 TABLET: 7.5; 325 TABLET ORAL at 07:49

## 2018-09-16 RX ADMIN — IBUPROFEN 800 MG: 800 TABLET ORAL at 10:09

## 2018-09-16 RX ADMIN — IBUPROFEN 800 MG: 800 TABLET ORAL at 22:57

## 2018-09-16 RX ADMIN — SIMETHICONE CHEW TAB 80 MG 80 MG: 80 TABLET ORAL at 07:50

## 2018-09-16 RX ADMIN — INFLUENZA VIRUS VACCINE 0.5 ML: 15; 15; 15; 15 SUSPENSION INTRAMUSCULAR at 07:52

## 2018-09-16 RX ADMIN — SIMETHICONE CHEW TAB 80 MG 80 MG: 80 TABLET ORAL at 22:57

## 2018-09-16 RX ADMIN — OXYCODONE HYDROCHLORIDE AND ACETAMINOPHEN 1 TABLET: 7.5; 325 TABLET ORAL at 22:57

## 2018-09-16 RX ADMIN — SIMETHICONE CHEW TAB 80 MG 80 MG: 80 TABLET ORAL at 12:30

## 2018-09-16 RX ADMIN — IBUPROFEN 800 MG: 800 TABLET ORAL at 17:09

## 2018-09-16 RX ADMIN — DOCUSATE SODIUM 100 MG: 100 CAPSULE, LIQUID FILLED ORAL at 17:09

## 2018-09-16 RX ADMIN — SIMETHICONE CHEW TAB 80 MG 80 MG: 80 TABLET ORAL at 17:09

## 2018-09-16 RX ADMIN — DOCUSATE SODIUM 100 MG: 100 CAPSULE, LIQUID FILLED ORAL at 07:50

## 2018-09-16 RX ADMIN — IBUPROFEN 800 MG: 800 TABLET ORAL at 03:05

## 2018-09-16 RX ADMIN — TETANUS TOXOID, REDUCED DIPHTHERIA TOXOID AND ACELLULAR PERTUSSIS VACCINE, ADSORBED 0.5 ML: 5; 2.5; 8; 8; 2.5 SUSPENSION INTRAMUSCULAR at 07:51

## 2018-09-16 RX ADMIN — OXYCODONE HYDROCHLORIDE AND ACETAMINOPHEN 2 TABLET: 7.5; 325 TABLET ORAL at 12:30

## 2018-09-16 RX ADMIN — OXYCODONE HYDROCHLORIDE AND ACETAMINOPHEN 1 TABLET: 7.5; 325 TABLET ORAL at 17:09

## 2018-09-16 NOTE — PROGRESS NOTES
09/16/18 1271 Pain Assessment Pain Scale 1 Numeric (0 - 10) Pain Intensity 1 7 Pain Location 1 Head;Incisional  
Pain Orientation 1 Lower Pain Description 1 Sore Pain Intervention(s) 1 Medication (see MAR)

## 2018-09-16 NOTE — PROGRESS NOTES
Report received from Aleksander Fisher RN care assumed. Pt sitting up in bed with no complaints at this time. Call light within reach.

## 2018-09-16 NOTE — PROGRESS NOTES
Report received from Jared Fried RN, and care assumed. Bedside report completed. Pt denies any needs at present.

## 2018-09-16 NOTE — DISCHARGE SUMMARY
Post-Operative Day Number 2 Progress/Discharge Note    Patient doing well post-op day 2 from  delivery without significant complaints. Pain controlled on current medication. Voiding without difficulty, normal lochia. Experienced mother considering early discharge to home. Vitals:  Patient Vitals for the past 8 hrs:   BP Temp Pulse Resp SpO2   18 0659 108/59 98.4 °F (36.9 °C) 79 18 97 %     Temp (24hrs), Av.5 °F (36.9 °C), Min:98.1 °F (36.7 °C), Max:99 °F (37.2 °C)      Vital signs stable, afebrile. Exam:  Patient without distress. Abdomen soft, fundus firm at level of umbilicus, non tender. Incision dry and                      clean without erythema. Lower extremities are negative for swelling, cords or tenderness. Lab/Data Review: All lab results for the last 24 hours reviewed. Assessment and Plan:  Patient appears to be having uncomplicated post- course. Continue routine post-op care and maternal education. Plan discharge for today with follow up in our office in 1-2 weeks.

## 2018-09-16 NOTE — PROGRESS NOTES
09/16/18 1233 Pain Assessment Pain Scale 1 Numeric (0 - 10) Pain Intensity 1 8 Pain Location 1 Incisional  
Pain Orientation 1 Lower Pain Description 1 Sore Pain Intervention(s) 1 Medication (see MAR)

## 2018-09-16 NOTE — PROGRESS NOTES
09/16/18 1009 Pain Assessment Pain Scale 1 Numeric (0 - 10) Pain Intensity 1 5 Pain Location 1 Incisional  
Pain Orientation 1 Lower Pain Description 1 Sore Pain Intervention(s) 1 Medication (see MAR)

## 2018-09-16 NOTE — PROGRESS NOTES
Shift assessment complete see flowsheet. Discussed tonight plan of care with pt. Pt denies h/a at this time but did have h/a earlier today but denies vision changes, RUQ pain. Informed pt that if h/a comes back or if she develops any of the above symptoms to let this nurse know. Encouraged pt to ambulate in the hallway tonight. Pt states vaginal bleeding has decreased since delivery. Motrin given for pain see MAR. Pt given EPDS form to fill out. Pt in bed with call light in reach.

## 2018-09-16 NOTE — LACTATION NOTE
In to follow up with mom and infant. Experienced mom stated that infant islatching and nursing well and she has no concerns at this time. Mom stated that she may discharge to home later today but she hasn't decided for sure yet. Encouraged mom to call out for assistance if needed.

## 2018-09-17 VITALS
RESPIRATION RATE: 16 BRPM | HEART RATE: 64 BPM | OXYGEN SATURATION: 96 % | SYSTOLIC BLOOD PRESSURE: 108 MMHG | BODY MASS INDEX: 29.95 KG/M2 | WEIGHT: 169 LBS | TEMPERATURE: 98.3 F | DIASTOLIC BLOOD PRESSURE: 70 MMHG | HEIGHT: 63 IN

## 2018-09-17 PROCEDURE — 74011250637 HC RX REV CODE- 250/637: Performed by: OBSTETRICS & GYNECOLOGY

## 2018-09-17 RX ADMIN — DOCUSATE SODIUM 100 MG: 100 CAPSULE, LIQUID FILLED ORAL at 10:46

## 2018-09-17 RX ADMIN — SIMETHICONE CHEW TAB 80 MG 80 MG: 80 TABLET ORAL at 10:46

## 2018-09-17 RX ADMIN — OXYCODONE HYDROCHLORIDE AND ACETAMINOPHEN 1 TABLET: 7.5; 325 TABLET ORAL at 05:32

## 2018-09-17 RX ADMIN — IBUPROFEN 800 MG: 800 TABLET ORAL at 05:32

## 2018-09-17 RX ADMIN — OXYCODONE HYDROCHLORIDE AND ACETAMINOPHEN 2 TABLET: 7.5; 325 TABLET ORAL at 10:46

## 2018-09-17 NOTE — LACTATION NOTE
In to see mom and infant prior to discharge to home. Mom stated that her milk is starting to come in and she is having to wake infant to latch and nurse. Reviewed with mom the fact that she may have to initially pump one breast to empty it if infant is too sleepy after nursing on the first breast to latch and nurse on the second. Informed her that it would be needed only until infant's stomach adjusts to the volume that she has. Otherwise mom stated that she has no questions or concerns at this time. Encouraged mom to follow up with our outpatient lactation consultant as needed.

## 2018-09-17 NOTE — PROGRESS NOTES
Bedside report completed with Neli Mtz. Plan of care reviewed with patient, verbalized understanding. Care assumed.

## 2018-09-17 NOTE — DISCHARGE SUMMARY
Obstetrical Discharge Summary     Name: Franc Queen MRN: [id number]  SSN: [id number]    YOB: 1980  Age: 41 y.o. Sex: female      Allergies: Sulfa (sulfonamide antibiotics) and Sulfanilamide    Admit Date: 2018    Discharge Date: 2018     Admitting Physician: Lou Laura MD     Attending Physician:  Lou Laura MD     * Admission Diagnoses: jennifer 2018 Repeat  Section (#4);39 weeks gestati*    * Discharge Diagnoses:   Information for the patient's :  Larissa Ty [[id number]]   Delivery of a 7 lb 4.2 oz (3.295 kg) female infant via , Low Transverse on 2018 at 9:06 AM  by . Apgars were 9 and 9. Additional Diagnoses:   Hospital Problems as of 2018  Date Reviewed: 2018          Codes Class Noted - Resolved POA    * (Principal)H/O  section ICD-10-CM: Z98.891  ICD-9-CM: V45.89  2018 - Present Yes        39 weeks gestation of pregnancy ICD-10-CM: Z3A.39  ICD-9-CM: V22.2  2018 - Present Unknown             Lab Results   Component Value Date/Time    ABO/Rh(D) O POSITIVE 2018 07:20 AM    Rubella, External immune 2018    GrBStrep, External negative 2014    ABO,Rh O positive 2018      Immunization History   Administered Date(s) Administered    Influenza Vaccine (Quad) PF 2018    Tdap 2014, 2018       * Procedures:   Procedure(s) with comments:   SECTION - jennifer 2018 Repeat  Section (#4)           * Discharge Condition: stable    * Hospital Course: Normal hospital course following the delivery. She was deemed stable for discharge home on ppd#3. Hg was 9.1 on ppd#1. * Disposition: Home    Discharge Medications:   Current Discharge Medication List      START taking these medications    Details   oxyCODONE-acetaminophen (PERCOCET 7.5) 7.5-325 mg per tablet Take 1-2 Tabs by mouth every six (6) hours as needed. Max Daily Amount: 8 Tabs.   Qty: 30 Tab, Refills: 0    Associated Diagnoses: Maternal care due to low transverse uterine scar from previous  delivery      ibuprofen (MOTRIN) 800 mg tablet Take 1 Tab by mouth every eight (8) hours as needed. Qty: 30 Tab, Refills: 0         CONTINUE these medications which have NOT CHANGED    Details   butalbital-acetaminophen-caffeine (FIORICET, ESGIC) -40 mg per tablet 1-2 tab po q 6 hrs prn headache  Qty: 30 Tab, Refills: 3    Associated Diagnoses: Chronic migraine without aura without status migrainosus, not intractable      Ferrous Sulfate (SLOW FE) 47.5 mg iron TbER tablet Take 1 Tab by mouth daily. Qty: 30 Tab, Refills: 6    Associated Diagnoses: Iron deficiency anemia due to chronic blood loss      esomeprazole (NEXIUM) 40 mg capsule Take  by mouth daily. raNITIdine (ZANTAC) 75 mg tablet Take 75 mg by mouth two (2) times a day. acetaminophen (TYLENOL) 325 mg tablet Take  by mouth every four (4) hours as needed for Pain. doxylamine succinate (UNISOM) 25 mg tablet Take 25 mg by mouth nightly as needed for Sleep. cholecalciferol (VITAMIN D3) 1,000 unit cap Take  by mouth daily. prenatal vit-iron fumarate-fa (PRENATAL S) 27-0.8 mg Tab tablet Take 1 Tab by mouth daily. * Follow-up Care/Patient Instructions:   Activity: No driving for 2 weeks  Diet: Regular Diet  Wound Care: As directed    Follow-up Information     Follow up With Details Comments [address] [facility] DO Krissy In 2 weeks  [address] [facility]  [address] [facility]  [phone]             Signed By:  Griselda Haney DO     2018

## 2018-09-17 NOTE — PROGRESS NOTES
Shift assessment complete see flowsheet. Discussed tonight plan of care with pt. Patient denies needs at this time. Questions encouraged and answered. Encouraged to call for needs or concerns. Pt verbalizes understanding. Pt in bed with call light in reach.

## 2018-09-17 NOTE — PROGRESS NOTES
Post-Partum Day Number 3 Progress Note Patient doing well  without significant complaints. Pain controlled on current medication. Voiding without difficulty, normal lochia. Vitals:   
Visit Vitals  /70 (BP 1 Location: Right arm, BP Patient Position: At rest)  Pulse 64  Temp 98.3 °F (36.8 °C)  Resp 16  
 Ht 5' 3\" (1.6 m)  Wt 169 lb (76.7 kg)  LMP 12/14/2017 (Approximate)  SpO2 96%  Breastfeeding Yes  BMI 29.94 kg/m2 Vital signs stable, afebrile. Exam:  Patient without distress. Heart rrr 
Lungs cta b&s Abdomen soft, fundus firm at level of umbilicus, nontender. Incision clean, dry and intact. Lower extremities are negative for swelling, cords or tenderness. Lab Results Component Value Date/Time ABO Group O 02/19/2018 01:54 PM  
 Rh (D) Positive 02/19/2018 01:54 PM  
 ABO/Rh(D) Rupinder Duran POSITIVE 09/14/2018 07:20 AM  
 
  
Lab Results Component Value Date/Time ABO/Rh(D) O POSITIVE 09/14/2018 07:20 AM  
 Antibody screen NEG 09/14/2018 07:20 AM  
 Antibody screen, External negative 02/19/2018 Rubella, External immune 02/19/2018 GrBStrep, External negative 01/30/2014 ABO,Rh O positive 02/19/2018 Lab Results Component Value Date/Time HGB 9.1 (L) 09/15/2018 05:35 AM  
 Hgb, External 10.8 07/10/2018 Assessment and Plan:  Patient appears to be having uncomplicated post-partum course. Continue routine post-delivery care and maternal education. Breastfeeding. Acute blood loss anemia -asymptomatic. Will discharge home.

## (undated) DEVICE — SOLUTION IV 1000ML 0.9% SOD CHL

## (undated) DEVICE — TRAY PREP DRY W/ PREM GLV 2 APPL 6 SPNG 2 UNDPD 1 OVERWRAP

## (undated) DEVICE — CONTAINER SPEC HISTOLOGY 900ML POLYPR

## (undated) DEVICE — SOLUTION IRRIG 1000ML H2O STRL BLT

## (undated) DEVICE — REM POLYHESIVE ADULT PATIENT RETURN ELECTRODE: Brand: VALLEYLAB

## (undated) DEVICE — SUTURE VCRL SZ 1 L27IN ABSRB UD CT-1 L36MM 1/2 CIR J261H

## (undated) DEVICE — SUTURE VCRL SZ 4-0 L27IN ABSRB UD L60MM KS STR REV CUT NDL J662H

## (undated) DEVICE — CATH FOL TY IC BAG 16FR 2000ML -- CONVERT TO ITEM 363158

## (undated) DEVICE — U.V.A.C. SWIVEL HANDLE W/TUBING, 6': Brand: CONVERTORS

## (undated) DEVICE — Z DISCONTINUED USE 2659133 CANNULA VAC DIA8MM CRV SEMI RIG W/ ROUNDED TIP TAPR END

## (undated) DEVICE — PENCIL ES L3M BTTN SWCH S STL HEX LOK BLDE ELECTRD HOLSTER

## (undated) DEVICE — STERILE POLYISOPRENE POWDER-FREE SURGICAL GLOVES: Brand: PROTEXIS

## (undated) DEVICE — Device: Brand: PORTEX

## (undated) DEVICE — PERI-PAD,MODERATE: Brand: CURITY

## (undated) DEVICE — DRAPE,UNDERBUTTOCKS,PCH,STERILE: Brand: MEDLINE

## (undated) DEVICE — SURGICAL PROCEDURE PACK C SECT CDS

## (undated) DEVICE — CYSTO: Brand: MEDLINE INDUSTRIES, INC.

## (undated) DEVICE — KENDALL SCD EXPRESS SLEEVES, KNEE LENGTH, MEDIUM: Brand: KENDALL SCD

## (undated) DEVICE — CARDINAL HEALTH FLEXIBLE LIGHT HANDLE COVER: Brand: CARDINAL HEALTH

## (undated) DEVICE — GOWN,REINF,POLY,ECL,PP SLV,XL: Brand: MEDLINE

## (undated) DEVICE — DRAPE TWL SURG 16X26IN BLU ORB04] ALLCARE INC]

## (undated) DEVICE — SUT CHRMC 1 36IN CTX BRN --

## (undated) DEVICE — AMD ANTIMICROBIAL GAUZE SPONGES,12 PLY USP TYPE VII, 0.2% POLYHEXAMETHYLENE BIGUANIDE HCI (PHMB): Brand: CURITY

## (undated) DEVICE — PVC URETHRAL CATHETER: Brand: DOVER

## (undated) DEVICE — MEDI-VAC NON-CONDUCTIVE SUCTION TUBING: Brand: CARDINAL HEALTH

## (undated) DEVICE — (D)PREP SKN CHLRAPRP APPL 26ML -- CONVERT TO ITEM 371833

## (undated) DEVICE — AMD ANTIMICROBIAL NON-ADHERENT PAD,0.2% POLYHEXAMETHYLENE BIGUANIDE HCI (PHMB): Brand: TELFA